# Patient Record
Sex: FEMALE | Race: OTHER | HISPANIC OR LATINO
[De-identification: names, ages, dates, MRNs, and addresses within clinical notes are randomized per-mention and may not be internally consistent; named-entity substitution may affect disease eponyms.]

---

## 2018-06-27 ENCOUNTER — TRANSCRIPTION ENCOUNTER (OUTPATIENT)
Age: 48
End: 2018-06-27

## 2019-11-25 ENCOUNTER — APPOINTMENT (OUTPATIENT)
Dept: SURGICAL ONCOLOGY | Facility: CLINIC | Age: 49
End: 2019-11-25
Payer: COMMERCIAL

## 2019-11-25 ENCOUNTER — OUTPATIENT (OUTPATIENT)
Dept: OUTPATIENT SERVICES | Facility: HOSPITAL | Age: 49
LOS: 1 days | End: 2019-11-25
Payer: COMMERCIAL

## 2019-11-25 VITALS
HEART RATE: 84 BPM | DIASTOLIC BLOOD PRESSURE: 72 MMHG | SYSTOLIC BLOOD PRESSURE: 126 MMHG | BODY MASS INDEX: 33.61 KG/M2 | HEIGHT: 61 IN | TEMPERATURE: 98.6 F | WEIGHT: 178 LBS

## 2019-11-25 DIAGNOSIS — Z78.9 OTHER SPECIFIED HEALTH STATUS: ICD-10-CM

## 2019-11-25 DIAGNOSIS — Z86.39 PERSONAL HISTORY OF OTHER ENDOCRINE, NUTRITIONAL AND METABOLIC DISEASE: ICD-10-CM

## 2019-11-25 DIAGNOSIS — Z83.438 FAMILY HISTORY OF OTHER DISORDER OF LIPOPROTEIN METABOLISM AND OTHER LIPIDEMIA: ICD-10-CM

## 2019-11-25 DIAGNOSIS — Z01.818 ENCOUNTER FOR OTHER PREPROCEDURAL EXAMINATION: ICD-10-CM

## 2019-11-25 PROCEDURE — 93010 ELECTROCARDIOGRAM REPORT: CPT

## 2019-11-25 PROCEDURE — 93005 ELECTROCARDIOGRAM TRACING: CPT

## 2019-11-25 PROCEDURE — 99203 OFFICE O/P NEW LOW 30 MIN: CPT

## 2019-11-25 RX ORDER — METFORMIN HYDROCHLORIDE 500 MG/1
500 TABLET, COATED ORAL
Refills: 0 | Status: ACTIVE | COMMUNITY

## 2019-11-25 NOTE — ASSESSMENT
[FreeTextEntry1] : I) GB polyp\par \par P) Long discussion w patient about findings.Has a concerning (based on MRI) GB polyp measuring 1cm. Treatment of choice is resection. Discussed laparoscopic cholecystectomy. If at the time of surgery we find a malignant process would do definitive wedge of liver and portal lymphadenectomy. Discussed that this is unlikely.  Will plan on minimally invasive approach. Risks and benefits discussed, including but not limited to post bleeding, infection. All questions answered. Will send Ca 19-9 and review MRI. Arrangements will be made. \par \par Deangelo Mclaughlin MD\par \par Chief Surgical Oncology\par Multidisciplinary GI cancer program\par [facility] [place]\par [facility]\par \par Professor Surgery\par [employer]\par \par \par cc Dr Camargo\par

## 2019-11-25 NOTE — PHYSICAL EXAM
[Normal] : supple, no neck mass and thyroid not enlarged [Normal] : oriented to person, place and time, with appropriate affect [de-identified] : Anicteric [de-identified] : S1,S2, regular rate and rhythm. No murmurs heard. [de-identified] : Clear throughout. No wheezes heard. [de-identified] : warm and dry

## 2019-11-25 NOTE — HISTORY OF PRESENT ILLNESS
[de-identified] : Patient Name: YULIYA FRANCOIS \par MRN: [id number] \par [place] MRN: \par Referring Provider: Dr. Camargo\par Oncologist: n/a\par Date: Nov 25, 2019 \par \par Diagnosis: Gallbladder polyp\par \par She  presents for evaluation of a gallbaldder polyp. She went for routine blood work with her PCP, Dr. Mancini and was noted to have elevated LFTs. She went for ultrasound of the abdomen showing a 1.2cm gallbladder polyp. She was referred to Dr. Camargo and had an MRI confirming a 1.0cm gallbladder polyp, surgical consultation is recommended due to its size and presence of enhancement. She is now here for surgical consultation.\par \par Curently, Ms. FRANCOIS occasionally has right sided abdominal pain and discomfort which started about a month ago. Pain radiates to her back but is unrelated to food, denies having decreased appetite, and denies nausea or vomiting. She denies changes in bowel habits and denies recent unintentional weight loss. She denies fevers, chills, or night sweats. No jaundice. Denies history of pancreatitis.\par \par Functional Status: Ms. FRANCOIS is able to walk up 2 flights of stairs without fatigue or dyspnea.\par \par

## 2019-11-25 NOTE — RESULTS/DATA
[FreeTextEntry1] : Diagnostic Studies\par \par Date: 9/14/19\par Study: Ultrasound abdomen ([facility])\par Results: (1) 1.2 cm gallbladder polyp. consider further characterization with MRI/MRCP (2) fatty infiltration of the liver\par Gallbladder - solid lobulated nodule measures 1.2x0.7x1.2cm\par \par Date: 10/22/19\par Study: MRI MRCP ([facility] radiology [facility])\par Results: (1) Hepatic steatosis (2) Findings most concordant with a 1.0cm gallbladder polyp. Given its size and presence of enhancement, malignancy cannot be excluded. There fore, surgical consultation is recommended. (3) No abdominal ascites or significant lymphadenopathy. (4) Additional findings described in detail above.\par \par \par Date:\par Study:\par Results:\par \par \par Date:\par Study:\par Results:\par \par Labs:\par \par Date:\par Results:\par \par Pathology:\par \par Date:\par Results:\par \par

## 2019-11-27 LAB
ABO + RH PNL BLD: NORMAL
ABO + RH PNL BLD: NORMAL
ALBUMIN SERPL ELPH-MCNC: 4.1 G/DL
ALP BLD-CCNC: 101 U/L
ALT SERPL-CCNC: 19 U/L
ANION GAP SERPL CALC-SCNC: 13 MMOL/L
APTT BLD: 31.3 SEC
AST SERPL-CCNC: 13 U/L
BASOPHILS # BLD AUTO: 0.06 K/UL
BASOPHILS NFR BLD AUTO: 0.8 %
BILIRUB DIRECT SERPL-MCNC: 0.1 MG/DL
BILIRUB INDIRECT SERPL-MCNC: 0.1 MG/DL
BILIRUB SERPL-MCNC: 0.2 MG/DL
BUN SERPL-MCNC: 20 MG/DL
CALCIUM SERPL-MCNC: 9.4 MG/DL
CANCER AG19-9 SERPL-ACNC: 8 U/ML
CHLORIDE SERPL-SCNC: 106 MMOL/L
CO2 SERPL-SCNC: 22 MMOL/L
CREAT SERPL-MCNC: 0.46 MG/DL
EOSINOPHIL # BLD AUTO: 0.11 K/UL
EOSINOPHIL NFR BLD AUTO: 1.5 %
GLUCOSE SERPL-MCNC: 111 MG/DL
HCT VFR BLD CALC: 42 %
HGB BLD-MCNC: 13.6 G/DL
IMM GRANULOCYTES NFR BLD AUTO: 0.4 %
INR PPP: 0.95 RATIO
LYMPHOCYTES # BLD AUTO: 2.79 K/UL
LYMPHOCYTES NFR BLD AUTO: 36.9 %
MAN DIFF?: NORMAL
MCHC RBC-ENTMCNC: 30.3 PG
MCHC RBC-ENTMCNC: 32.4 GM/DL
MCV RBC AUTO: 93.5 FL
MONOCYTES # BLD AUTO: 0.57 K/UL
MONOCYTES NFR BLD AUTO: 7.5 %
NEUTROPHILS # BLD AUTO: 4.01 K/UL
NEUTROPHILS NFR BLD AUTO: 52.9 %
PLATELET # BLD AUTO: 255 K/UL
POTASSIUM SERPL-SCNC: 4.2 MMOL/L
PROT SERPL-MCNC: 7.1 G/DL
PT BLD: 10.8 SEC
RBC # BLD: 4.49 M/UL
RBC # FLD: 13.4 %
SODIUM SERPL-SCNC: 141 MMOL/L
WBC # FLD AUTO: 7.57 K/UL

## 2019-12-20 ENCOUNTER — APPOINTMENT (OUTPATIENT)
Dept: SURGICAL ONCOLOGY | Facility: AMBULATORY SURGERY CENTER | Age: 49
End: 2019-12-20

## 2020-01-10 ENCOUNTER — RESULT REVIEW (OUTPATIENT)
Age: 50
End: 2020-01-10

## 2020-01-10 ENCOUNTER — OUTPATIENT (OUTPATIENT)
Dept: OUTPATIENT SERVICES | Facility: HOSPITAL | Age: 50
LOS: 1 days | Discharge: HOME | End: 2020-01-10
Payer: COMMERCIAL

## 2020-01-10 ENCOUNTER — APPOINTMENT (OUTPATIENT)
Dept: SURGICAL ONCOLOGY | Facility: AMBULATORY SURGERY CENTER | Age: 50
End: 2020-01-10

## 2020-01-10 LAB — GLUCOSE BLDC GLUCOMTR-MCNC: 123 MG/DL — HIGH (ref 70–99)

## 2020-01-10 PROCEDURE — 47562 LAPAROSCOPIC CHOLECYSTECTOMY: CPT

## 2020-01-10 PROCEDURE — 88304 TISSUE EXAM BY PATHOLOGIST: CPT | Mod: 26

## 2020-01-15 LAB — SURGICAL PATHOLOGY STUDY: SIGNIFICANT CHANGE UP

## 2020-01-28 ENCOUNTER — APPOINTMENT (OUTPATIENT)
Dept: SURGICAL ONCOLOGY | Facility: CLINIC | Age: 50
End: 2020-01-28
Payer: COMMERCIAL

## 2020-01-28 VITALS
BODY MASS INDEX: 32.85 KG/M2 | DIASTOLIC BLOOD PRESSURE: 68 MMHG | WEIGHT: 174 LBS | SYSTOLIC BLOOD PRESSURE: 119 MMHG | TEMPERATURE: 98.3 F | HEIGHT: 61 IN | HEART RATE: 78 BPM

## 2020-01-28 DIAGNOSIS — Z90.49 ACQUIRED ABSENCE OF OTHER SPECIFIED PARTS OF DIGESTIVE TRACT: ICD-10-CM

## 2020-01-28 DIAGNOSIS — K82.4 CHOLESTEROLOSIS OF GALLBLADDER: ICD-10-CM

## 2020-01-28 PROCEDURE — 99024 POSTOP FOLLOW-UP VISIT: CPT

## 2020-01-28 NOTE — HISTORY OF PRESENT ILLNESS
[FreeTextEntry1] : Patient Name: YULIYA FRANCOIS \par MRN: [id number] \par [facility] MRN: [id number] \par Referring Provider: Dr. Camargo\par Oncologist: n/a\par Date: 1/28/2020\par \par Diagnosis: gallbladder polyp\par Operative Date: 1/10/2020\par Procedure: lap cholecystectomy\par Pathology: polypoid chronic cholecystitis\par \par  18 days s/p lap cholecystectomy. She feels well overall. \par \par Currently, Ms. FRANCOIS occasionally has some mild right upper quadrant abdominal pain and discomfort. She denies fevers, chills, or night sweats. She is tolerating a low fat diet and is having regular bowel movements. Pain is well controlled.\par \par Final Pathology Showed: polypoid chronic cholecystitis\par \par

## 2020-01-28 NOTE — ASSESSMENT
[FreeTextEntry1] : I) normal postop\par \par P) RTC prn\par \par Deangelo Mclaughlin MD\par \par Chief Surgical Oncology\par Multidisciplinary GI cancer program\par [facility] [place]\par [facility]\par \par Professor Surgery\par [employer] [facility] [employer]\par \par cc Dr Camargo

## 2020-01-28 NOTE — PHYSICAL EXAM
[Normal] : well developed, well nourished, in no acute distress [de-identified] : soft, non tender, surgical sites are healing. no signs of infection

## 2022-06-30 ENCOUNTER — INPATIENT (INPATIENT)
Facility: HOSPITAL | Age: 52
LOS: 5 days | Discharge: HOME | DRG: 372 | End: 2022-07-06
Attending: INTERNAL MEDICINE | Admitting: INTERNAL MEDICINE
Payer: MEDICAID

## 2022-06-30 VITALS
HEART RATE: 97 BPM | TEMPERATURE: 97 F | OXYGEN SATURATION: 98 % | DIASTOLIC BLOOD PRESSURE: 88 MMHG | WEIGHT: 147.05 LBS | SYSTOLIC BLOOD PRESSURE: 117 MMHG | RESPIRATION RATE: 16 BRPM

## 2022-06-30 LAB
ALBUMIN SERPL ELPH-MCNC: 3.3 G/DL — LOW (ref 3.5–5)
ALP SERPL-CCNC: 120 U/L — SIGNIFICANT CHANGE UP (ref 40–120)
ALT FLD-CCNC: 323 U/L DA — HIGH (ref 10–60)
ANION GAP SERPL CALC-SCNC: 11 MMOL/L — SIGNIFICANT CHANGE UP (ref 5–17)
AST SERPL-CCNC: 170 U/L — HIGH (ref 10–40)
BASOPHILS # BLD AUTO: 0.04 K/UL — SIGNIFICANT CHANGE UP (ref 0–0.2)
BASOPHILS NFR BLD AUTO: 0.6 % — SIGNIFICANT CHANGE UP (ref 0–2)
BILIRUB SERPL-MCNC: 0.5 MG/DL — SIGNIFICANT CHANGE UP (ref 0.2–1.2)
BUN SERPL-MCNC: 12 MG/DL — SIGNIFICANT CHANGE UP (ref 7–18)
CALCIUM SERPL-MCNC: 8.8 MG/DL — SIGNIFICANT CHANGE UP (ref 8.4–10.5)
CHLORIDE SERPL-SCNC: 102 MMOL/L — SIGNIFICANT CHANGE UP (ref 96–108)
CO2 SERPL-SCNC: 20 MMOL/L — LOW (ref 22–31)
CREAT SERPL-MCNC: 0.57 MG/DL — SIGNIFICANT CHANGE UP (ref 0.5–1.3)
EGFR: 108 ML/MIN/1.73M2 — SIGNIFICANT CHANGE UP
EOSINOPHIL # BLD AUTO: 0.03 K/UL — SIGNIFICANT CHANGE UP (ref 0–0.5)
EOSINOPHIL NFR BLD AUTO: 0.4 % — SIGNIFICANT CHANGE UP (ref 0–6)
GLUCOSE SERPL-MCNC: 299 MG/DL — HIGH (ref 70–99)
HCT VFR BLD CALC: 46.2 % — HIGH (ref 34.5–45)
HGB BLD-MCNC: 15.9 G/DL — HIGH (ref 11.5–15.5)
IMM GRANULOCYTES NFR BLD AUTO: 0.4 % — SIGNIFICANT CHANGE UP (ref 0–1.5)
LIDOCAIN IGE QN: 92 U/L — SIGNIFICANT CHANGE UP (ref 73–393)
LYMPHOCYTES # BLD AUTO: 0.96 K/UL — LOW (ref 1–3.3)
LYMPHOCYTES # BLD AUTO: 14.2 % — SIGNIFICANT CHANGE UP (ref 13–44)
MCHC RBC-ENTMCNC: 30.6 PG — SIGNIFICANT CHANGE UP (ref 27–34)
MCHC RBC-ENTMCNC: 34.4 GM/DL — SIGNIFICANT CHANGE UP (ref 32–36)
MCV RBC AUTO: 89 FL — SIGNIFICANT CHANGE UP (ref 80–100)
MONOCYTES # BLD AUTO: 0.6 K/UL — SIGNIFICANT CHANGE UP (ref 0–0.9)
MONOCYTES NFR BLD AUTO: 8.9 % — SIGNIFICANT CHANGE UP (ref 2–14)
NEUTROPHILS # BLD AUTO: 5.11 K/UL — SIGNIFICANT CHANGE UP (ref 1.8–7.4)
NEUTROPHILS NFR BLD AUTO: 75.5 % — SIGNIFICANT CHANGE UP (ref 43–77)
NRBC # BLD: 0 /100 WBCS — SIGNIFICANT CHANGE UP (ref 0–0)
PLATELET # BLD AUTO: 191 K/UL — SIGNIFICANT CHANGE UP (ref 150–400)
POTASSIUM SERPL-MCNC: 3.7 MMOL/L — SIGNIFICANT CHANGE UP (ref 3.5–5.3)
POTASSIUM SERPL-SCNC: 3.7 MMOL/L — SIGNIFICANT CHANGE UP (ref 3.5–5.3)
PROT SERPL-MCNC: 7.5 G/DL — SIGNIFICANT CHANGE UP (ref 6–8.3)
RBC # BLD: 5.19 M/UL — SIGNIFICANT CHANGE UP (ref 3.8–5.2)
RBC # FLD: 12.8 % — SIGNIFICANT CHANGE UP (ref 10.3–14.5)
SODIUM SERPL-SCNC: 133 MMOL/L — LOW (ref 135–145)
WBC # BLD: 6.77 K/UL — SIGNIFICANT CHANGE UP (ref 3.8–10.5)
WBC # FLD AUTO: 6.77 K/UL — SIGNIFICANT CHANGE UP (ref 3.8–10.5)

## 2022-06-30 PROCEDURE — 99285 EMERGENCY DEPT VISIT HI MDM: CPT

## 2022-06-30 RX ORDER — SODIUM CHLORIDE 9 MG/ML
1000 INJECTION INTRAMUSCULAR; INTRAVENOUS; SUBCUTANEOUS ONCE
Refills: 0 | Status: COMPLETED | OUTPATIENT
Start: 2022-06-30 | End: 2022-06-30

## 2022-06-30 RX ADMIN — SODIUM CHLORIDE 1000 MILLILITER(S): 9 INJECTION INTRAMUSCULAR; INTRAVENOUS; SUBCUTANEOUS at 18:02

## 2022-06-30 RX ADMIN — SODIUM CHLORIDE 1000 MILLILITER(S): 9 INJECTION INTRAMUSCULAR; INTRAVENOUS; SUBCUTANEOUS at 19:44

## 2022-06-30 RX ADMIN — SODIUM CHLORIDE 1000 MILLILITER(S): 9 INJECTION INTRAMUSCULAR; INTRAVENOUS; SUBCUTANEOUS at 20:50

## 2022-06-30 RX ADMIN — SODIUM CHLORIDE 1000 MILLILITER(S): 9 INJECTION INTRAMUSCULAR; INTRAVENOUS; SUBCUTANEOUS at 19:15

## 2022-06-30 NOTE — ED PROVIDER NOTE - PROGRESS NOTE DETAILS
Patient is resting comfortably, NAD. Awaiting CTAP. Signed out to Dr. Bobo. Jihan: ct with mildly dilated CBD and possible retained stone. given elevated LFTs with ct result will admit for GI workup.

## 2022-06-30 NOTE — ED ADULT NURSE NOTE - ED STAT RN HANDOFF DETAILS
Report given to JP Rowley. Pt resting in bed, no acute distress noted, denies chest pain, no shortness of breath indicated.

## 2022-06-30 NOTE — ED ADULT TRIAGE NOTE - RESPIRATORY RATE (BREATHS/MIN)
How Severe Is It?: moderate Is This A New Presentation, Or A Follow-Up?: Follow Up Hidradenitis Suppurativa 16

## 2022-06-30 NOTE — ED ADULT NURSE NOTE - NSIMPLEMENTINTERV_GEN_ALL_ED
Implemented All Universal Safety Interventions:  [employer] to call system. Call bell, personal items and telephone within reach. Instruct patient to call for assistance. Room bathroom lighting operational. Non-slip footwear when patient is off stretcher. Physically safe environment: no spills, clutter or unnecessary equipment. Stretcher in lowest position, wheels locked, appropriate side rails in place.

## 2022-06-30 NOTE — ED ADULT NURSE NOTE - OBJECTIVE STATEMENT
Pt c/o diarrhea and elevated blood sugar x last night. No acute distress noted, denies chest pain, no shortness of breath indicated.

## 2022-07-01 DIAGNOSIS — R74.01 ELEVATION OF LEVELS OF LIVER TRANSAMINASE LEVELS: ICD-10-CM

## 2022-07-01 DIAGNOSIS — K80.50 CALCULUS OF BILE DUCT WITHOUT CHOLANGITIS OR CHOLECYSTITIS WITHOUT OBSTRUCTION: ICD-10-CM

## 2022-07-01 DIAGNOSIS — Z29.9 ENCOUNTER FOR PROPHYLACTIC MEASURES, UNSPECIFIED: ICD-10-CM

## 2022-07-01 DIAGNOSIS — R19.7 DIARRHEA, UNSPECIFIED: ICD-10-CM

## 2022-07-01 DIAGNOSIS — Z90.49 ACQUIRED ABSENCE OF OTHER SPECIFIED PARTS OF DIGESTIVE TRACT: Chronic | ICD-10-CM

## 2022-07-01 DIAGNOSIS — I10 ESSENTIAL (PRIMARY) HYPERTENSION: ICD-10-CM

## 2022-07-01 DIAGNOSIS — E11.9 TYPE 2 DIABETES MELLITUS WITHOUT COMPLICATIONS: ICD-10-CM

## 2022-07-01 DIAGNOSIS — E78.5 HYPERLIPIDEMIA, UNSPECIFIED: ICD-10-CM

## 2022-07-01 LAB
A1C WITH ESTIMATED AVERAGE GLUCOSE RESULT: 12.9 % — HIGH (ref 4–5.6)
ALBUMIN SERPL ELPH-MCNC: 2.7 G/DL — LOW (ref 3.5–5)
ALP SERPL-CCNC: 95 U/L — SIGNIFICANT CHANGE UP (ref 40–120)
ALT FLD-CCNC: 208 U/L DA — HIGH (ref 10–60)
ANION GAP SERPL CALC-SCNC: 9 MMOL/L — SIGNIFICANT CHANGE UP (ref 5–17)
APPEARANCE UR: CLEAR — SIGNIFICANT CHANGE UP
AST SERPL-CCNC: 53 U/L — HIGH (ref 10–40)
BILIRUB SERPL-MCNC: 0.3 MG/DL — SIGNIFICANT CHANGE UP (ref 0.2–1.2)
BILIRUB UR-MCNC: NEGATIVE — SIGNIFICANT CHANGE UP
BUN SERPL-MCNC: 10 MG/DL — SIGNIFICANT CHANGE UP (ref 7–18)
CALCIUM SERPL-MCNC: 8.1 MG/DL — LOW (ref 8.4–10.5)
CHLORIDE SERPL-SCNC: 107 MMOL/L — SIGNIFICANT CHANGE UP (ref 96–108)
CHOLEST SERPL-MCNC: 130 MG/DL — SIGNIFICANT CHANGE UP
CO2 SERPL-SCNC: 23 MMOL/L — SIGNIFICANT CHANGE UP (ref 22–31)
COLOR SPEC: YELLOW — SIGNIFICANT CHANGE UP
CREAT SERPL-MCNC: 0.36 MG/DL — LOW (ref 0.5–1.3)
DIFF PNL FLD: ABNORMAL
EGFR: 121 ML/MIN/1.73M2 — SIGNIFICANT CHANGE UP
ESTIMATED AVERAGE GLUCOSE: 324 MG/DL — HIGH (ref 68–114)
GLUCOSE BLDC GLUCOMTR-MCNC: 146 MG/DL — HIGH (ref 70–99)
GLUCOSE BLDC GLUCOMTR-MCNC: 179 MG/DL — HIGH (ref 70–99)
GLUCOSE BLDC GLUCOMTR-MCNC: 199 MG/DL — HIGH (ref 70–99)
GLUCOSE BLDC GLUCOMTR-MCNC: 217 MG/DL — HIGH (ref 70–99)
GLUCOSE BLDC GLUCOMTR-MCNC: 283 MG/DL — HIGH (ref 70–99)
GLUCOSE SERPL-MCNC: 174 MG/DL — HIGH (ref 70–99)
GLUCOSE UR QL: 1000 MG/DL
HCG UR QL: NEGATIVE — SIGNIFICANT CHANGE UP
HCT VFR BLD CALC: 40.6 % — SIGNIFICANT CHANGE UP (ref 34.5–45)
HDLC SERPL-MCNC: 33 MG/DL — LOW
HGB BLD-MCNC: 13.9 G/DL — SIGNIFICANT CHANGE UP (ref 11.5–15.5)
KETONES UR-MCNC: NEGATIVE — SIGNIFICANT CHANGE UP
LEUKOCYTE ESTERASE UR-ACNC: NEGATIVE — SIGNIFICANT CHANGE UP
LIPID PNL WITH DIRECT LDL SERPL: 68 MG/DL — SIGNIFICANT CHANGE UP
MAGNESIUM SERPL-MCNC: 2 MG/DL — SIGNIFICANT CHANGE UP (ref 1.6–2.6)
MCHC RBC-ENTMCNC: 30.3 PG — SIGNIFICANT CHANGE UP (ref 27–34)
MCHC RBC-ENTMCNC: 34.2 GM/DL — SIGNIFICANT CHANGE UP (ref 32–36)
MCV RBC AUTO: 88.6 FL — SIGNIFICANT CHANGE UP (ref 80–100)
NITRITE UR-MCNC: NEGATIVE — SIGNIFICANT CHANGE UP
NON HDL CHOLESTEROL: 97 MG/DL — SIGNIFICANT CHANGE UP
NRBC # BLD: 0 /100 WBCS — SIGNIFICANT CHANGE UP (ref 0–0)
PH UR: 5 — SIGNIFICANT CHANGE UP (ref 5–8)
PHOSPHATE SERPL-MCNC: 1.9 MG/DL — LOW (ref 2.5–4.5)
PLATELET # BLD AUTO: 180 K/UL — SIGNIFICANT CHANGE UP (ref 150–400)
POTASSIUM SERPL-MCNC: 2.9 MMOL/L — CRITICAL LOW (ref 3.5–5.3)
POTASSIUM SERPL-SCNC: 2.9 MMOL/L — CRITICAL LOW (ref 3.5–5.3)
PROT SERPL-MCNC: 6.1 G/DL — SIGNIFICANT CHANGE UP (ref 6–8.3)
PROT UR-MCNC: 15
RBC # BLD: 4.58 M/UL — SIGNIFICANT CHANGE UP (ref 3.8–5.2)
RBC # FLD: 13.2 % — SIGNIFICANT CHANGE UP (ref 10.3–14.5)
SARS-COV-2 RNA SPEC QL NAA+PROBE: SIGNIFICANT CHANGE UP
SODIUM SERPL-SCNC: 139 MMOL/L — SIGNIFICANT CHANGE UP (ref 135–145)
SP GR SPEC: 1.02 — SIGNIFICANT CHANGE UP (ref 1.01–1.02)
TRIGL SERPL-MCNC: 146 MG/DL — SIGNIFICANT CHANGE UP
TSH SERPL-MCNC: 1.33 UU/ML — SIGNIFICANT CHANGE UP (ref 0.34–4.82)
UROBILINOGEN FLD QL: NEGATIVE — SIGNIFICANT CHANGE UP
WBC # BLD: 4.73 K/UL — SIGNIFICANT CHANGE UP (ref 3.8–10.5)
WBC # FLD AUTO: 4.73 K/UL — SIGNIFICANT CHANGE UP (ref 3.8–10.5)

## 2022-07-01 PROCEDURE — 74177 CT ABD & PELVIS W/CONTRAST: CPT | Mod: 26,MG

## 2022-07-01 PROCEDURE — G1004: CPT

## 2022-07-01 RX ORDER — POTASSIUM CHLORIDE 20 MEQ
40 PACKET (EA) ORAL ONCE
Refills: 0 | Status: COMPLETED | OUTPATIENT
Start: 2022-07-01 | End: 2022-07-01

## 2022-07-01 RX ORDER — DEXTROSE 50 % IN WATER 50 %
25 SYRINGE (ML) INTRAVENOUS ONCE
Refills: 0 | Status: DISCONTINUED | OUTPATIENT
Start: 2022-07-01 | End: 2022-07-01

## 2022-07-01 RX ORDER — GLUCAGON INJECTION, SOLUTION 0.5 MG/.1ML
1 INJECTION, SOLUTION SUBCUTANEOUS ONCE
Refills: 0 | Status: DISCONTINUED | OUTPATIENT
Start: 2022-07-01 | End: 2022-07-06

## 2022-07-01 RX ORDER — INSULIN LISPRO 100/ML
VIAL (ML) SUBCUTANEOUS AT BEDTIME
Refills: 0 | Status: DISCONTINUED | OUTPATIENT
Start: 2022-07-01 | End: 2022-07-02

## 2022-07-01 RX ORDER — ENOXAPARIN SODIUM 100 MG/ML
40 INJECTION SUBCUTANEOUS EVERY 24 HOURS
Refills: 0 | Status: DISCONTINUED | OUTPATIENT
Start: 2022-07-01 | End: 2022-07-06

## 2022-07-01 RX ORDER — DEXTROSE 50 % IN WATER 50 %
12.5 SYRINGE (ML) INTRAVENOUS ONCE
Refills: 0 | Status: DISCONTINUED | OUTPATIENT
Start: 2022-07-01 | End: 2022-07-01

## 2022-07-01 RX ORDER — SODIUM CHLORIDE 9 MG/ML
1000 INJECTION, SOLUTION INTRAVENOUS
Refills: 0 | Status: DISCONTINUED | OUTPATIENT
Start: 2022-07-01 | End: 2022-07-06

## 2022-07-01 RX ORDER — INSULIN LISPRO 100/ML
VIAL (ML) SUBCUTANEOUS EVERY 8 HOURS
Refills: 0 | Status: DISCONTINUED | OUTPATIENT
Start: 2022-07-01 | End: 2022-07-02

## 2022-07-01 RX ORDER — POTASSIUM PHOSPHATE, MONOBASIC POTASSIUM PHOSPHATE, DIBASIC 236; 224 MG/ML; MG/ML
30 INJECTION, SOLUTION INTRAVENOUS ONCE
Refills: 0 | Status: COMPLETED | OUTPATIENT
Start: 2022-07-01 | End: 2022-07-01

## 2022-07-01 RX ORDER — PANTOPRAZOLE SODIUM 20 MG/1
40 TABLET, DELAYED RELEASE ORAL
Refills: 0 | Status: DISCONTINUED | OUTPATIENT
Start: 2022-07-01 | End: 2022-07-06

## 2022-07-01 RX ORDER — ONDANSETRON 8 MG/1
4 TABLET, FILM COATED ORAL ONCE
Refills: 0 | Status: DISCONTINUED | OUTPATIENT
Start: 2022-07-01 | End: 2022-07-06

## 2022-07-01 RX ORDER — SODIUM CHLORIDE 9 MG/ML
1000 INJECTION INTRAMUSCULAR; INTRAVENOUS; SUBCUTANEOUS
Refills: 0 | Status: DISCONTINUED | OUTPATIENT
Start: 2022-07-01 | End: 2022-07-06

## 2022-07-01 RX ORDER — INSULIN LISPRO 100/ML
VIAL (ML) SUBCUTANEOUS
Refills: 0 | Status: DISCONTINUED | OUTPATIENT
Start: 2022-07-01 | End: 2022-07-01

## 2022-07-01 RX ORDER — ATORVASTATIN CALCIUM 80 MG/1
20 TABLET, FILM COATED ORAL AT BEDTIME
Refills: 0 | Status: DISCONTINUED | OUTPATIENT
Start: 2022-07-01 | End: 2022-07-06

## 2022-07-01 RX ORDER — POTASSIUM CHLORIDE 20 MEQ
10 PACKET (EA) ORAL
Refills: 0 | Status: COMPLETED | OUTPATIENT
Start: 2022-07-01 | End: 2022-07-01

## 2022-07-01 RX ORDER — METFORMIN HYDROCHLORIDE 850 MG/1
1 TABLET ORAL
Qty: 0 | Refills: 0 | DISCHARGE

## 2022-07-01 RX ORDER — DEXTROSE 50 % IN WATER 50 %
15 SYRINGE (ML) INTRAVENOUS ONCE
Refills: 0 | Status: DISCONTINUED | OUTPATIENT
Start: 2022-07-01 | End: 2022-07-01

## 2022-07-01 RX ORDER — SODIUM CHLORIDE 9 MG/ML
1000 INJECTION, SOLUTION INTRAVENOUS
Refills: 0 | Status: DISCONTINUED | OUTPATIENT
Start: 2022-07-01 | End: 2022-07-01

## 2022-07-01 RX ADMIN — PANTOPRAZOLE SODIUM 40 MILLIGRAM(S): 20 TABLET, DELAYED RELEASE ORAL at 08:31

## 2022-07-01 RX ADMIN — ENOXAPARIN SODIUM 40 MILLIGRAM(S): 100 INJECTION SUBCUTANEOUS at 05:52

## 2022-07-01 RX ADMIN — Medication 40 MILLIEQUIVALENT(S): at 12:53

## 2022-07-01 RX ADMIN — POTASSIUM PHOSPHATE, MONOBASIC POTASSIUM PHOSPHATE, DIBASIC 83.33 MILLIMOLE(S): 236; 224 INJECTION, SOLUTION INTRAVENOUS at 23:29

## 2022-07-01 RX ADMIN — Medication 1: at 16:12

## 2022-07-01 RX ADMIN — Medication 3: at 06:00

## 2022-07-01 RX ADMIN — ATORVASTATIN CALCIUM 20 MILLIGRAM(S): 80 TABLET, FILM COATED ORAL at 21:36

## 2022-07-01 RX ADMIN — Medication 100 MILLIEQUIVALENT(S): at 12:54

## 2022-07-01 RX ADMIN — Medication 100 MILLIEQUIVALENT(S): at 16:03

## 2022-07-01 RX ADMIN — Medication 100 MILLIEQUIVALENT(S): at 21:36

## 2022-07-01 RX ADMIN — SODIUM CHLORIDE 75 MILLILITER(S): 9 INJECTION INTRAMUSCULAR; INTRAVENOUS; SUBCUTANEOUS at 06:08

## 2022-07-01 NOTE — H&P ADULT - HISTORY OF PRESENT ILLNESS
52 year old female with a past medical history of DM and HLD and surgical history of cholecystectomy coming to ED complaining of diarrhea since yesterday morning. Patient states that she has had 10 episodes of non bloody diarrhea episodes associated with 1 episode of non bloody non bilious vomiting. She also endorses generalized abdominal discomfort relieved by bowel movements. She denies any fevers, chills, headaches dizziness, chest pain, SOB, abd pain, hematuria, dysuria, numbness, and weakness.    In ED VS: 97.2, HR 97, /88, RR 16, Spo2 98%  Na 133, ,    CT ABD: Mildly dilated common bile duct with question of distal stone  Received 2L NS bolus in ED

## 2022-07-01 NOTE — H&P ADULT - ASSESSMENT
52 year old female with a past medical history of DM and HLD and surgical history of cholecystectomy coming to ED complaining of diarrhea since yesterday morning found to have dilated CBD with possible stone, admitted for further workup.

## 2022-07-01 NOTE — H&P ADULT - NEUROLOGICAL
normal/cranial nerves II-XII intact/sensation intact Azithromycin Counseling:  I discussed with the patient the risks of azithromycin including but not limited to GI upset, allergic reaction, drug rash, diarrhea, and yeast infections.

## 2022-07-01 NOTE — CHART NOTE - NSCHARTNOTEFT_GEN_A_CORE
EVENT: seen and examined   Pt c/o intermittent abd pain, no further diarrhea or vomiting     OBJECTIVE:  Vital Signs Last 24 Hrs  T(C): 36.9 (01 Jul 2022 11:20), Max: 37.7 (30 Jun 2022 20:20)  T(F): 98.4 (01 Jul 2022 11:20), Max: 99.8 (30 Jun 2022 20:20)  HR: 67 (01 Jul 2022 11:20) (67 - 97)  BP: 106/62 (01 Jul 2022 11:20) (100/65 - 117/88)  BP(mean): --  RR: 17 (01 Jul 2022 11:20) (16 - 18)  SpO2: 98% (01 Jul 2022 11:20) (95% - 99%)    REVIEW OF SYSTEMS:  CONSTITUTIONAL: No fever, chills  NECK: No pain or stiffness  RESPIRATORY: No cough, SOB  CARDIOVASCULAR: No chest pain, palpitations  GASTROINTESTINAL: +ve abdominal pain. No nausea, vomiting, or diarrhea  GENITOURINARY: No dysuria  NEUROLOGICAL: No HA  MUSCULOSKELETAL: No joint pain or swelling; No muscle, back, or extremity pain    PHYSICAL EXAM:  GENERAL: NAD  EYES: clear conjunctiva  NECK: Supple, No JVD  CHEST/LUNG: Clear to auscultation bilaterally; No rales, rhonchi, wheezing, or rubs  HEART: S1, S2, Regular rate and rhythm  ABDOMEN: Soft, Nontender, Nondistended; Bowel sounds present  NEURO: Alert & Oriented X3  EXTREMITIES: No LE edema, no calf tenderness    LABS:                        13.9   4.73  )-----------( 180      ( 01 Jul 2022 10:56 )             40.6     07-01    139  |  107  |  10  ----------------------------<  174<H>  2.9<LL>   |  23  |  0.36<L>    Ca    8.1<L>      01 Jul 2022 10:56  Phos  1.9     07-01  Mg     2.0     07-01    TPro  6.1  /  Alb  2.7<L>  /  TBili  0.3  /  DBili  x   /  AST  53<H>  /  ALT  208<H>  /  AlkPhos  95  07-01      < from: CT Abdomen and Pelvis w/ IV Cont (07.01.22 @ 00:59) >    PROCEDURE DATE:  07/01/2022          INTERPRETATION:  CLINICAL INFORMATION: Nausea and vomiting.    COMPARISON: None.    CONTRAST/COMPLICATIONS:  IV Contrast: Omnipaque 350  90 cc administered   10 cc discarded  Oral Contrast: NONE  Complications: None reported at time of study completion    PROCEDURE:  CT of the Abdomen and Pelvis was performed.  Sagittal and coronal reformats were performed.    FINDINGS:  LOWER CHEST: Within normal limits.    LIVER: Diffuse low attenuation suggesting steatosis.  BILE DUCTS: Dilatation of the common bile duct to 10 mm with questionable   stone in the distal duct measuring 7 mm (series 4 image 62). No   significant intrahepatic biliary duct dilatation  GALLBLADDER: Cholecystectomy.  SPLEEN: Within normal limits.  PANCREAS: Within normal limits.  ADRENALS: Within normal limits.  KIDNEYS/URETERS: Within normal limits.    BLADDER: Underdistended.  REPRODUCTIVE ORGANS: The uterus and adnexal structures are within normal   limits.    BOWEL: No bowel obstruction. Appendix is normal. No significant colonic   wall thickening or pericolonic inflammatory change.  PERITONEUM: No ascites.  VESSELS: Within normal limits.  RETROPERITONEUM/LYMPH NODES: No lymphadenopathy.  ABDOMINAL WALL: Within normal limits.  BONES: Within normal limits.    IMPRESSION:  No bowel obstruction, wall thickening or inflammatory change.    Mildly dilated common bile duct with question of distal stone. Consider   follow-up with MRCP for further evaluation.    < end of copied text >      A/P: 52 year old female with a past medical history of DM and HLD and surgical history of cholecystectomy coming to ED complaining of diarrhea     Diarrhea:   -likely due to gastroenteritis, diarrhea improved   -cont IVF   -start clear liquid  -supportive measures   -GI Dr. Chase consulted     Transaminitis.   -CT Abd with mildly dilated common bile duct with question of distal stone, hx of cholecystectomy 2 years ago   -mon hepatic function   -rest plan as above     Dvt ppx;   -Lovenox EVENT: seen and examined   Pt c/o intermittent abd pain, no further diarrhea or vomiting     OBJECTIVE:  Vital Signs Last 24 Hrs  T(C): 36.9 (01 Jul 2022 11:20), Max: 37.7 (30 Jun 2022 20:20)  T(F): 98.4 (01 Jul 2022 11:20), Max: 99.8 (30 Jun 2022 20:20)  HR: 67 (01 Jul 2022 11:20) (67 - 97)  BP: 106/62 (01 Jul 2022 11:20) (100/65 - 117/88)  BP(mean): --  RR: 17 (01 Jul 2022 11:20) (16 - 18)  SpO2: 98% (01 Jul 2022 11:20) (95% - 99%)    REVIEW OF SYSTEMS:  CONSTITUTIONAL: No fever, chills  NECK: No pain or stiffness  RESPIRATORY: No cough, SOB  CARDIOVASCULAR: No chest pain, palpitations  GASTROINTESTINAL: +ve abdominal pain. No nausea, vomiting, or diarrhea  GENITOURINARY: No dysuria  NEUROLOGICAL: No HA  MUSCULOSKELETAL: No joint pain or swelling; No muscle, back, or extremity pain    PHYSICAL EXAM:  GENERAL: NAD  EYES: clear conjunctiva  NECK: Supple, No JVD  CHEST/LUNG: Clear to auscultation bilaterally; No rales, rhonchi, wheezing, or rubs  HEART: S1, S2, Regular rate and rhythm  ABDOMEN: Soft, Nontender, Nondistended; Bowel sounds present  NEURO: Alert & Oriented X3  EXTREMITIES: No LE edema, no calf tenderness    LABS:                        13.9   4.73  )-----------( 180      ( 01 Jul 2022 10:56 )             40.6     07-01    139  |  107  |  10  ----------------------------<  174<H>  2.9<LL>   |  23  |  0.36<L>    Ca    8.1<L>      01 Jul 2022 10:56  Phos  1.9     07-01  Mg     2.0     07-01    TPro  6.1  /  Alb  2.7<L>  /  TBili  0.3  /  DBili  x   /  AST  53<H>  /  ALT  208<H>  /  AlkPhos  95  07-01      < from: CT Abdomen and Pelvis w/ IV Cont (07.01.22 @ 00:59) >    PROCEDURE DATE:  07/01/2022          INTERPRETATION:  CLINICAL INFORMATION: Nausea and vomiting.    COMPARISON: None.    CONTRAST/COMPLICATIONS:  IV Contrast: Omnipaque 350  90 cc administered   10 cc discarded  Oral Contrast: NONE  Complications: None reported at time of study completion    PROCEDURE:  CT of the Abdomen and Pelvis was performed.  Sagittal and coronal reformats were performed.    FINDINGS:  LOWER CHEST: Within normal limits.    LIVER: Diffuse low attenuation suggesting steatosis.  BILE DUCTS: Dilatation of the common bile duct to 10 mm with questionable   stone in the distal duct measuring 7 mm (series 4 image 62). No   significant intrahepatic biliary duct dilatation  GALLBLADDER: Cholecystectomy.  SPLEEN: Within normal limits.  PANCREAS: Within normal limits.  ADRENALS: Within normal limits.  KIDNEYS/URETERS: Within normal limits.    BLADDER: Underdistended.  REPRODUCTIVE ORGANS: The uterus and adnexal structures are within normal   limits.    BOWEL: No bowel obstruction. Appendix is normal. No significant colonic   wall thickening or pericolonic inflammatory change.  PERITONEUM: No ascites.  VESSELS: Within normal limits.  RETROPERITONEUM/LYMPH NODES: No lymphadenopathy.  ABDOMINAL WALL: Within normal limits.  BONES: Within normal limits.    IMPRESSION:  No bowel obstruction, wall thickening or inflammatory change.    Mildly dilated common bile duct with question of distal stone. Consider   follow-up with MRCP for further evaluation.    < end of copied text >      A/P: 52 year old female with a past medical history of DM and HLD and surgical history of cholecystectomy coming to ED complaining of diarrhea     Diarrhea:   -likely due to gastroenteritis, diarrhea improved   -cont IVF   -start clear liquid  -supportive measures   -GI Dr. Chase consulted     Transaminitis.   -CT Abd with mildly dilated common bile duct with question of distal stone, hx of cholecystectomy 2 years ago   -mon hepatic function   -rest plan as above     Hypokalemia:  -likely due to poor PO intake and GI loses   -supplemented   -f/u repeat in AM     Hypophosphatemia:   -likely due to poor PO intake and GI loses   -supplemented   -f/u repeat in AM     Dvt ppx;   -Lovenox

## 2022-07-01 NOTE — CONSULT NOTE ADULT - NEGATIVE ENMT SYMPTOMS
no hearing difficulty/no ear pain/no tinnitus/no vertigo/no sinus symptoms/no nasal discharge/no nasal obstruction/no nose bleeds/no dry mouth/no throat pain/no dysphagia

## 2022-07-01 NOTE — CONSULT NOTE ADULT - MUSCULOSKELETAL
ROM intact/no joint swelling/no joint erythema/no joint warmth/no calf tenderness/decreased ROM/no chest wall tenderness details…

## 2022-07-01 NOTE — CONSULT NOTE ADULT - NEGATIVE MUSCULOSKELETAL SYMPTOMS
no muscle cramps/no muscle weakness/no neck pain/no arm pain L/no arm pain R/no back pain/no leg pain L/no leg pain R

## 2022-07-01 NOTE — PATIENT PROFILE ADULT - FALL HARM RISK - UNIVERSAL INTERVENTIONS
Bed in lowest position, wheels locked, appropriate side rails in place/Call bell, personal items and telephone in reach/Instruct patient to call for assistance before getting out of bed or chair/Non-slip footwear when patient is out of bed/[place] to call system/Physically safe environment - no spills, clutter or unnecessary equipment/Purposeful Proactive Rounding/Room/bathroom lighting operational, light cord in reach

## 2022-07-01 NOTE — CONSULT NOTE ADULT - SUBJECTIVE AND OBJECTIVE BOX
[  ] STAT REQUEST              [ X ]ROUTINE REQUEST      Patient is a 52 year old female with diarrhea and dilated CBD. GI consulted to evaluate.       HPI:  52 year old female with a past medical history significant for DM and HLD and surgical history of cholecystectomy presented to the emergency room with 2 days history of watery, non bloody diarrhea associated with intermittent crampy non radiating, 5-6/10 intensity periumbilical abdominal painone episode of non bloody vomiting. Patient denies hematemesis, hematochezia, melena, fever, chills, chest pain, SOB, cough, hematuria, dysuria or recent traveling or antibiotic use.        PAIN MANAGEMENT:  Pain Scale:                 5-6/10  Pain Location:  Periumbilical abdominal pain      Prior Colonoscopy:      PAST MEDICAL HISTORY  Diabetes mellitus  Hypertension      PAST SURGICAL HISTORY  Cholecystectomy        Allergies    No Known Allergies    Intolerances  None         MEDICATIONS  (STANDING):  atorvastatin 20 milliGRAM(s) Oral at bedtime  dextrose 5%. 1000 milliLiter(s) (50 mL/Hr) IV Continuous <Continuous>  enoxaparin Injectable 40 milliGRAM(s) SubCutaneous every 24 hours  glucagon  Injectable 1 milliGRAM(s) IntraMuscular once  insulin lispro (ADMELOG) corrective regimen sliding scale   SubCutaneous at bedtime  insulin lispro (ADMELOG) corrective regimen sliding scale.   SubCutaneous every 8 hours  pantoprazole    Tablet 40 milliGRAM(s) Oral before breakfast  potassium chloride  10 mEq/100 mL IVPB 10 milliEquivalent(s) IV Intermittent every 1 hour  potassium phosphate IVPB 30 milliMole(s) IV Intermittent once  sodium chloride 0.9%. 1000 milliLiter(s) (75 mL/Hr) IV Continuous <Continuous>    MEDICATIONS  (PRN):  ondansetron Injectable 4 milliGRAM(s) IV Push once PRN Nausea and/or Vomiting      SOCIAL HISTORY  Advanced Directives:       [ X ] Full Code       [  ] DNR  Marital Status:         [  ] M      [ X ] S      [  ] D       [  ] W  Children:       [ X ] Yes      [  ] No  Occupation:        [  ] Employed       [X  ] Unemployed       [  ] Retired  Diet:       [ X ] Regular       [  ] PEG feeding          [  ] NG tube feeding  Drug Use:           [ X ] Patient denied          [  ] Yes  Alcohol:           [ X ] No             [  ] Yes (socially)         [  ] Yes (chronic)  Tobacco:           [  ] Yes           [ X ] No      FAMILY HISTORY  [ X ] Heart Disease            [ X ] Diabetes             [ X ] HTN             [  ] Colon Cancer             [  ] Stomach Cancer              [  ] Pancreatic Cancer      VITAL SIGNS   Vital Signs Last 24 Hrs  T(C): 36.9 (01 Jul 2022 15:33), Max: 37.7 (30 Jun 2022 20:20)  T(F): 98.5 (01 Jul 2022 15:33), Max: 99.8 (30 Jun 2022 20:20)  HR: 68 (01 Jul 2022 15:33) (67 - 94)  BP: 92/55 (01 Jul 2022 15:33) (92/55 - 110/69)   RR: 17 (01 Jul 2022 15:33) (16 - 18)  SpO2: 98% (01 Jul 2022 15:33) (95% - 99%)          CBC Full  -  ( 01 Jul 2022 10:56 )  WBC Count : 4.73 K/uL  RBC Count : 4.58 M/uL  Hemoglobin : 13.9 g/dL  Hematocrit : 40.6 %  Platelet Count - Automated : 180 K/uL  Mean Cell Volume : 88.6 fl  Mean Cell Hemoglobin : 30.3 pg  Mean Cell Hemoglobin Concentration : 34.2 gm/dL  Auto Neutrophil # : x  Auto Lymphocyte # : x  Auto Monocyte # : x  Auto Eosinophil # : x  Auto Basophil # : x  Auto Neutrophil % : x  Auto Lymphocyte % : x  Auto Monocyte % : x  Auto Eosinophil % : x  Auto Basophil % : x      07-01    139  |  107  |  10  ----------------------------<  174<H>  2.9<LL>   |  23  |  0.36<L>    Ca    8.1<L>      01 Jul 2022 10:56  Phos  1.9     07-01  Mg     2.0     07-01    TPro  6.1  /  Alb  2.7<L>  /  TBili  0.3  /  DBili  x   /  AST  53<H>  /  ALT  208<H>  /  AlkPhos  95  07-01    Lipase, Serum: 92 U/L (06-30 @ 17:18)      Urinalysis (07.01.22 @ 01:07)   Glucose Qualitative, Urine: 1000 mg/dL   Blood, Urine: Moderate   pH Urine: 5.0   Color: Yellow   Urine Appearance: Clear   Bilirubin: Negative   Ketone - Urine: Negative   Specific Gravity: 1.020   Protein, Urine: 15   Urobilinogen: Negative   Nitrite: Negative   Leukocyte Esterase Concentration: Negative < from: 12 Lead ECG (11.25.19 @ 13:02) >    Ventricular Rate 69 BPM    Atrial Rate 69 BPM    P-R Interval 134 ms    QRS Duration 78 ms    Q-T Interval 386 ms    QTC Calculation(Bezet) 413 ms    P Axis 39 degrees    R Axis 57 degrees    T Axis 48 degrees    Diagnosis Line Normal sinus rhythm  EKG within normal limits    < end of copied text >      RADIOLOGY/IMAGING                ACC: [id number] EXAM:  CT ABDOMEN AND PELVIS IC                          PROCEDURE DATE:  07/01/2022          INTERPRETATION:  CLINICAL INFORMATION: Nausea and vomiting.    COMPARISON: None.    CONTRAST/COMPLICATIONS:  IV Contrast: Omnipaque 350  90 cc administered   10 cc discarded  Oral Contrast: NONE  Complications: None reported at time of study completion    PROCEDURE:  CT of the Abdomen and Pelvis was performed.  Sagittal and coronal reformats were performed.    FINDINGS:  LOWER CHEST: Within normal limits.    LIVER: Diffuse low attenuation suggesting steatosis.  BILE DUCTS: Dilatation of the common bile duct to 10 mm with questionable   stone in the distal duct measuring 7 mm (series 4 image 62). No   significant intrahepatic biliary duct dilatation  GALLBLADDER: Cholecystectomy.  SPLEEN: Within normal limits.  PANCREAS: Within normal limits.  ADRENALS: Within normal limits.  KIDNEYS/URETERS: Within normal limits.    BLADDER: Underdistended.  REPRODUCTIVE ORGANS: The uterus and adnexal structures are within normal   limits.    BOWEL: No bowel obstruction. Appendix is normal. No significant colonic   wall thickening or pericolonic inflammatory change.  PERITONEUM: No ascites.  VESSELS: Within normal limits.  RETROPERITONEUM/LYMPH NODES: No lymphadenopathy.  ABDOMINAL WALL: Within normal limits.  BONES: Within normal limits.    IMPRESSION:  No bowel obstruction, wall thickening or inflammatory change.    Mildly dilated common bile duct with question of distal stone. Consider   follow-up with MRCP for further evaluation.               [  ] STAT REQUEST              [ X ]ROUTINE REQUEST      Patient is a 52 year old female with diarrhea and dilated CBD. GI consulted to evaluate.       HPI:  52 year old female with a past medical history significant for DM and HLD and surgical history of cholecystectomy presented to the emergency room with 2 days history of watery, non bloody diarrhea associated with intermittent crampy non radiating, 5-6/10 intensity periumbilical abdominal painone episode of non bloody vomiting. Patient denies hematemesis, hematochezia, melena, fever, chills, chest pain, SOB, cough, hematuria, dysuria or recent traveling or antibiotic use.        PAIN MANAGEMENT:  Pain Scale:                 5-6/10  Pain Location:  Periumbilical abdominal pain      Prior Colonoscopy:      PAST MEDICAL HISTORY  Diabetes mellitus  Hypertension      PAST SURGICAL HISTORY  Cholecystectomy        Allergies    No Known Allergies    Intolerances  None         MEDICATIONS  (STANDING):  atorvastatin 20 milliGRAM(s) Oral at bedtime  dextrose 5%. 1000 milliLiter(s) (50 mL/Hr) IV Continuous <Continuous>  enoxaparin Injectable 40 milliGRAM(s) SubCutaneous every 24 hours  glucagon  Injectable 1 milliGRAM(s) IntraMuscular once  insulin lispro (ADMELOG) corrective regimen sliding scale   SubCutaneous at bedtime  insulin lispro (ADMELOG) corrective regimen sliding scale.   SubCutaneous every 8 hours  pantoprazole    Tablet 40 milliGRAM(s) Oral before breakfast  potassium chloride  10 mEq/100 mL IVPB 10 milliEquivalent(s) IV Intermittent every 1 hour  potassium phosphate IVPB 30 milliMole(s) IV Intermittent once  sodium chloride 0.9%. 1000 milliLiter(s) (75 mL/Hr) IV Continuous <Continuous>    MEDICATIONS  (PRN):  ondansetron Injectable 4 milliGRAM(s) IV Push once PRN Nausea and/or Vomiting      SOCIAL HISTORY  Advanced Directives:       [ X ] Full Code       [  ] DNR  Marital Status:         [  ] M      [ X ] S      [  ] D       [  ] W  Children:       [ X ] Yes      [  ] No  Occupation:        [  ] Employed       [X  ] Unemployed       [  ] Retired  Diet:       [ X ] Regular       [  ] PEG feeding          [  ] NG tube feeding  Drug Use:           [ X ] Patient denied          [  ] Yes  Alcohol:           [ X ] No             [  ] Yes (socially)         [  ] Yes (chronic)  Tobacco:           [  ] Yes           [ X ] No      FAMILY HISTORY  [ X ] Heart Disease            [ X ] Diabetes             [ X ] HTN             [  ] Colon Cancer             [  ] Stomach Cancer              [  ] Pancreatic Cancer      VITAL SIGNS   Vital Signs Last 24 Hrs  T(C): 36.9 (01 Jul 2022 15:33), Max: 37.7 (30 Jun 2022 20:20)  T(F): 98.5 (01 Jul 2022 15:33), Max: 99.8 (30 Jun 2022 20:20)  HR: 68 (01 Jul 2022 15:33) (67 - 94)  BP: 92/55 (01 Jul 2022 15:33) (92/55 - 110/69)   RR: 17 (01 Jul 2022 15:33) (16 - 18)  SpO2: 98% (01 Jul 2022 15:33) (95% - 99%)          CBC Full  -  ( 01 Jul 2022 10:56 )  WBC Count : 4.73 K/uL  RBC Count : 4.58 M/uL  Hemoglobin : 13.9 g/dL  Hematocrit : 40.6 %  Platelet Count - Automated : 180 K/uL  Mean Cell Volume : 88.6 fl  Mean Cell Hemoglobin : 30.3 pg  Mean Cell Hemoglobin Concentration : 34.2 gm/dL  Auto Neutrophil # : x  Auto Lymphocyte # : x  Auto Monocyte # : x  Auto Eosinophil # : x  Auto Basophil # : x  Auto Neutrophil % : x  Auto Lymphocyte % : x  Auto Monocyte % : x  Auto Eosinophil % : x  Auto Basophil % : x      07-01    139  |  107  |  10  ----------------------------<  174<H>  2.9<LL>   |  23  |  0.36<L>    Ca    8.1<L>      01 Jul 2022 10:56  Phos  1.9     07-01  Mg     2.0     07-01    TPro  6.1  /  Alb  2.7<L>  /  TBili  0.3  /  DBili  x   /  AST  53<H>  /  ALT  208<H>  /  AlkPhos  95  07-01    Lipase, Serum: 92 U/L (06-30 @ 17:18)      Urinalysis (07.01.22 @ 01:07)   Glucose Qualitative, Urine: 1000 mg/dL   Blood, Urine: Moderate   pH Urine: 5.0   Color: Yellow   Urine Appearance: Clear   Bilirubin: Negative   Ketone - Urine: Negative   Specific Gravity: 1.020   Protein, Urine: 15   Urobilinogen: Negative   Nitrite: Negative   Leukocyte Esterase Concentration: Negative       ECG    Ventricular Rate 69 BPM    Atrial Rate 69 BPM    P-R Interval 134 ms    QRS Duration 78 ms    Q-T Interval 386 ms    QTC Calculation(Bezet) 413 ms    P Axis 39 degrees    R Axis 57 degrees    T Axis 48 degrees    Diagnosis Line Normal sinus rhythm  EKG within normal limits         RADIOLOGY/IMAGING                ACC: [id number] EXAM:  CT ABDOMEN AND PELVIS IC                          PROCEDURE DATE:  07/01/2022          INTERPRETATION:  CLINICAL INFORMATION: Nausea and vomiting.    COMPARISON: None.    CONTRAST/COMPLICATIONS:  IV Contrast: Omnipaque 350  90 cc administered   10 cc discarded  Oral Contrast: NONE  Complications: None reported at time of study completion    PROCEDURE:  CT of the Abdomen and Pelvis was performed.  Sagittal and coronal reformats were performed.    FINDINGS:  LOWER CHEST: Within normal limits.    LIVER: Diffuse low attenuation suggesting steatosis.  BILE DUCTS: Dilatation of the common bile duct to 10 mm with questionable   stone in the distal duct measuring 7 mm (series 4 image 62). No   significant intrahepatic biliary duct dilatation  GALLBLADDER: Cholecystectomy.  SPLEEN: Within normal limits.  PANCREAS: Within normal limits.  ADRENALS: Within normal limits.  KIDNEYS/URETERS: Within normal limits.    BLADDER: Underdistended.  REPRODUCTIVE ORGANS: The uterus and adnexal structures are within normal   limits.    BOWEL: No bowel obstruction. Appendix is normal. No significant colonic   wall thickening or pericolonic inflammatory change.  PERITONEUM: No ascites.  VESSELS: Within normal limits.  RETROPERITONEUM/LYMPH NODES: No lymphadenopathy.  ABDOMINAL WALL: Within normal limits.  BONES: Within normal limits.    IMPRESSION:  No bowel obstruction, wall thickening or inflammatory change.    Mildly dilated common bile duct with question of distal stone. Consider   follow-up with MRCP for further evaluation.

## 2022-07-01 NOTE — CONSULT NOTE ADULT - SUBJECTIVE AND OBJECTIVE BOX
Patient is a 52y old  Female who presents with a chief complaint of diarrhea, decreased oral intake (01 Jul 2022 05:19)  52 year old female with a past medical history of DM and HLD and surgical history of cholecystectomy coming to ED complaining of diarrhea since yesterday morning. Patient states that she has had 10 episodes of non bloody diarrhea episodes associated with 1 episode of non bloody non bilious vomiting. She also endorses generalized abdominal discomfort relieved by bowel movements. She denies any fevers, chills, headaches dizziness, chest pain, SOB, abd pain, hematuria, dysuria, numbness, and weakness.    INTERVAL HPI/OVERNIGHT EVENTS:  T(C): 36.6 (07-01-22 @ 00:00), Max: 37.7 (06-30-22 @ 20:20)  HR: 67 (07-01-22 @ 05:29) (67 - 97)  BP: 107/65 (07-01-22 @ 05:29) (100/68 - 117/88)  RR: 18 (07-01-22 @ 05:29) (16 - 18)  SpO2: 95% (07-01-22 @ 05:29) (95% - 98%)  Wt(kg): --  I&O's Summary      PAST MEDICAL & SURGICAL HISTORY:  Diabetes mellitus      Hypertension      S/P cholecystectomy          SOCIAL HISTORY  Alcohol:  Tobacco:  Illicit substance use:      FAMILY HISTORY:      LABS:                        15.9   6.77  )-----------( 191      ( 30 Jun 2022 17:18 )             46.2     06-30    133<L>  |  102  |  12  ----------------------------<  299<H>  3.7   |  20<L>  |  0.57    Ca    8.8      30 Jun 2022 17:18    TPro  7.5  /  Alb  3.3<L>  /  TBili  0.5  /  DBili  x   /  AST  170<H>  /  ALT  323<H>  /  AlkPhos  120  06-30      Urinalysis Basic - ( 01 Jul 2022 01:08 )    Color: x / Appearance: x / SG: x / pH: x  Gluc: x / Ketone: x  / Bili: x / Urobili: x   Blood: x / Protein: x / Nitrite: x   Leuk Esterase: x / RBC: 0-2 /HPF / WBC 3-5 /HPF   Sq Epi: x / Non Sq Epi: Few /HPF / Bacteria: Many /HPF      CAPILLARY BLOOD GLUCOSE      POCT Blood Glucose.: 315 mg/dL (30 Jun 2022 16:48)        Urinalysis Basic - ( 01 Jul 2022 01:08 )    Color: x / Appearance: x / SG: x / pH: x  Gluc: x / Ketone: x  / Bili: x / Urobili: x   Blood: x / Protein: x / Nitrite: x   Leuk Esterase: x / RBC: 0-2 /HPF / WBC 3-5 /HPF   Sq Epi: x / Non Sq Epi: Few /HPF / Bacteria: Many /HPF        MEDICATIONS  (STANDING):  atorvastatin 20 milliGRAM(s) Oral at bedtime  dextrose 5%. 1000 milliLiter(s) (50 mL/Hr) IV Continuous <Continuous>  enoxaparin Injectable 40 milliGRAM(s) SubCutaneous every 24 hours  glucagon  Injectable 1 milliGRAM(s) IntraMuscular once  insulin lispro (ADMELOG) corrective regimen sliding scale   SubCutaneous at bedtime  insulin lispro (ADMELOG) corrective regimen sliding scale.   SubCutaneous every 8 hours  pantoprazole    Tablet 40 milliGRAM(s) Oral before breakfast  sodium chloride 0.9%. 1000 milliLiter(s) (75 mL/Hr) IV Continuous <Continuous>    MEDICATIONS  (PRN):  ondansetron Injectable 4 milliGRAM(s) IV Push once PRN Nausea and/or Vomiting      REVIEW OF SYSTEMS:  CONSTITUTIONAL: No fever, weight loss, or fatigue  EYES: No eye pain, visual disturbances, or discharge  ENMT:  No difficulty hearing, tinnitus, vertigo; No sinus or throat pain  NECK: No pain or stiffness  RESPIRATORY: No cough, wheezing, chills or hemoptysis; No shortness of breath  CARDIOVASCULAR: No chest pain, palpitations, dizziness, or leg swelling  GASTROINTESTINAL: No abdominal or epigastric pain. No nausea, vomiting, or hematemesis; No diarrhea or constipation. No melena or hematochezia.  GENITOURINARY: No dysuria, frequency, hematuria, or incontinence  NEUROLOGICAL: No headaches, memory loss, loss of strength, numbness, or tremors  SKIN: No itching, burning, rashes, or lesions   LYMPH NODES: No enlarged glands  ENDOCRINE: No heat or cold intolerance; No hair loss  MUSCULOSKELETAL: No joint pain or swelling; No muscle, back, or extremity pain  PSYCHIATRIC: No depression, anxiety, mood swings, or difficulty sleeping  HEME/LYMPH: No easy bruising, or bleeding gums  ALLERY AND IMMUNOLOGIC: No hives or eczema    PHYSICAL EXAM:  GENERAL: NAD, well-groomed, well-developed  HEAD:  Atraumatic, Normocephalic  EYES: EOMI, PERRLA, conjunctiva and sclera clear  ENMT: No tonsillar erythema, exudates, or enlargement; Moist mucous membranes, Good dentition, No lesions  NECK: Supple, No JVD, Normal thyroid  NERVOUS SYSTEM:  Alert & Oriented X3, Good concentration; Motor Strength 5/5 B/L upper and lower extremities; DTRs 2+ intact and symmetric  CHEST/LUNG: Clear to percussion bilaterally; No rales, rhonchi, wheezing, or rubs  HEART: Regular rate and rhythm; No murmurs, rubs, or gallops  ABDOMEN: Soft, Nontender, Nondistended; Bowel sounds present  EXTREMITIES:  2+ Peripheral Pulses, No clubbing, cyanosis, or edema  LYMPH: No lymphadenopathy noted  SKIN: No rashes or lesions    RADIOLOGY & ADDITIONAL TESTS:    Imaging Personally Reviewed:  [ ] YES  [ ] NO    Consultant(s) Notes Reviewed:  [ ] YES  [ ] NO        Care Discussed with Consultants/Other Providers [ ] YES  [ ] NO Patient is a 52y old  Female who presents with a chief complaint of diarrhea, decreased oral intake (01 Jul 2022 05:19)  52 year old female with a past medical history of DM and HLD and surgical history of cholecystectomy coming to ED complaining of diarrhea since yesterday morning. Patient states that she has had 10 episodes of non bloody diarrhea episodes associated with 1 episode of non bloody non bilious vomiting. She also endorses generalized abdominal discomfort relieved by bowel movements. She denies any fevers, chills, headaches dizziness, chest pain, SOB, abd pain, hematuria, dysuria, numbness, and weakness.    INTERVAL HPI/OVERNIGHT EVENTS:  T(C): 36.6 (07-01-22 @ 00:00), Max: 37.7 (06-30-22 @ 20:20)  HR: 67 (07-01-22 @ 05:29) (67 - 97)  BP: 107/65 (07-01-22 @ 05:29) (100/68 - 117/88)  RR: 18 (07-01-22 @ 05:29) (16 - 18)  SpO2: 95% (07-01-22 @ 05:29) (95% - 98%)  Wt(kg): --  I&O's Summary      PAST MEDICAL & SURGICAL HISTORY:  Diabetes mellitus      Hypertension      S/P cholecystectomy          SOCIAL HISTORY  Alcohol:  Tobacco:  Illicit substance use:      FAMILY HISTORY:      LABS:                        15.9   6.77  )-----------( 191      ( 30 Jun 2022 17:18 )             46.2     06-30    133<L>  |  102  |  12  ----------------------------<  299<H>  3.7   |  20<L>  |  0.57    Ca    8.8      30 Jun 2022 17:18    TPro  7.5  /  Alb  3.3<L>  /  TBili  0.5  /  DBili  x   /  AST  170<H>  /  ALT  323<H>  /  AlkPhos  120  06-30      Urinalysis Basic - ( 01 Jul 2022 01:08 )    Color: x / Appearance: x / SG: x / pH: x  Gluc: x / Ketone: x  / Bili: x / Urobili: x   Blood: x / Protein: x / Nitrite: x   Leuk Esterase: x / RBC: 0-2 /HPF / WBC 3-5 /HPF   Sq Epi: x / Non Sq Epi: Few /HPF / Bacteria: Many /HPF      CAPILLARY BLOOD GLUCOSE      POCT Blood Glucose.: 315 mg/dL (30 Jun 2022 16:48)        Urinalysis Basic - ( 01 Jul 2022 01:08 )    Color: x / Appearance: x / SG: x / pH: x  Gluc: x / Ketone: x  / Bili: x / Urobili: x   Blood: x / Protein: x / Nitrite: x   Leuk Esterase: x / RBC: 0-2 /HPF / WBC 3-5 /HPF   Sq Epi: x / Non Sq Epi: Few /HPF / Bacteria: Many /HPF        MEDICATIONS  (STANDING):  atorvastatin 20 milliGRAM(s) Oral at bedtime  dextrose 5%. 1000 milliLiter(s) (50 mL/Hr) IV Continuous <Continuous>  enoxaparin Injectable 40 milliGRAM(s) SubCutaneous every 24 hours  glucagon  Injectable 1 milliGRAM(s) IntraMuscular once  insulin lispro (ADMELOG) corrective regimen sliding scale   SubCutaneous at bedtime  insulin lispro (ADMELOG) corrective regimen sliding scale.   SubCutaneous every 8 hours  pantoprazole    Tablet 40 milliGRAM(s) Oral before breakfast  sodium chloride 0.9%. 1000 milliLiter(s) (75 mL/Hr) IV Continuous <Continuous>    MEDICATIONS  (PRN):  ondansetron Injectable 4 milliGRAM(s) IV Push once PRN Nausea and/or Vomiting      REVIEW OF SYSTEMS:  CONSTITUTIONAL: No fever, weight loss, or fatigue  EYES: No eye pain, visual disturbances, or discharge  ENMT:  No difficulty hearing, tinnitus, vertigo; No sinus or throat pain  NECK: No pain or stiffness  RESPIRATORY: No cough, wheezing, chills or hemoptysis; No shortness of breath  CARDIOVASCULAR: No chest pain, palpitations, dizziness, or leg swelling  GASTROINTESTINAL: No abdominal or epigastric pain. No nausea, vomiting, or hematemesis; No diarrhea or constipation. No melena or hematochezia.  GENITOURINARY: No dysuria, frequency, hematuria, or incontinence  NEUROLOGICAL: No headaches, memory loss, loss of strength, numbness, or tremors  SKIN: No itching, burning, rashes, or lesions   LYMPH NODES: No enlarged glands  ENDOCRINE: No heat or cold intolerance; No hair loss  MUSCULOSKELETAL: No joint pain or swelling; No muscle, back, or extremity pain  PSYCHIATRIC: No depression, anxiety, mood swings, or difficulty sleeping  HEME/LYMPH: No easy bruising, or bleeding gums  ALLERY AND IMMUNOLOGIC: No hives or eczema    PHYSICAL EXAM:  GENERAL: NAD, well-groomed, well-developed  HEAD:  Atraumatic, Normocephalic  EYES: EOMI, PERRLA, conjunctiva and sclera clear  ENMT: No tonsillar erythema, exudates, or enlargement; Moist mucous membranes, Good dentition, No lesions  NECK: Supple, No JVD, Normal thyroid  NERVOUS SYSTEM:  Alert & Oriented X3, Good concentration; Motor Strength 5/5 B/L upper and lower extremities; DTRs 2+ intact and symmetric  CHEST/LUNG: Clear to percussion bilaterally; No rales, rhonchi, wheezing, or rubs  HEART: Regular rate and rhythm; No murmurs, rubs, or gallops  ABDOMEN: Soft, Nontender, Nondistended; Bowel sounds present  EXTREMITIES:  2+ Peripheral Pulses, No clubbing, cyanosis, or edema  LYMPH: No lymphadenopathy noted  SKIN: No rashes or lesions    RADIOLOGY & ADDITIONAL TESTS:  < from: CT Abdomen and Pelvis w/ IV Cont (07.01.22 @ 00:59) >  IMPRESSION:  No bowel obstruction, wall thickening or inflammatory change.    Mildly dilated common bile duct with question of distal stone. Consider   follow-up with MRCP for further evaluation.      < end of copied text >    Imaging Personally Reviewed:  [x ] YES  [ ] NO    Consultant(s) Notes Reviewed:  [ x] YES  [ ] NO        Care Discussed with Consultants/Other Providers [ x] YES  [ ] NO

## 2022-07-01 NOTE — CONSULT NOTE ADULT - ASSESSMENT
1. Diarrhea  2. Gastroenteritis  3. Dilated CBD with ? CBD stone  4. Fatty liver    Suggestions:    1. Check stool for culture and O&P  2. Monitor electrolytes  3. MRCP  4. Monitor LFT'S  5. Protonix daily  6. Avoid NSAID  7. DVT prophylaxis

## 2022-07-01 NOTE — H&P ADULT - PROBLEM SELECTOR PLAN 1
Patient coming in with 1 day hx of diarrhea and vomiting likely due to gastroenteritis  c/w symptomatic treatment  zofran for n/v  IVF 75cc/hr  NPO for bowel rest   Tylenol PRN for pain  f/u BMP

## 2022-07-01 NOTE — H&P ADULT - PROBLEM SELECTOR PLAN 2
CT Abd: Mildly dilated common bile duct with question of distal stone.  hx of cholecystectomy in past  GI consult in AM     CT Abd: Mildly dilated common bile duct with question of distal stone.  hx of cholecystectomy in past  GI consulted Dr. Chase

## 2022-07-02 LAB
ALBUMIN SERPL ELPH-MCNC: 2.5 G/DL — LOW (ref 3.5–5)
ALP SERPL-CCNC: 82 U/L — SIGNIFICANT CHANGE UP (ref 40–120)
ALT FLD-CCNC: 141 U/L DA — HIGH (ref 10–60)
ANION GAP SERPL CALC-SCNC: 10 MMOL/L — SIGNIFICANT CHANGE UP (ref 5–17)
AST SERPL-CCNC: 31 U/L — SIGNIFICANT CHANGE UP (ref 10–40)
BILIRUB SERPL-MCNC: 0.4 MG/DL — SIGNIFICANT CHANGE UP (ref 0.2–1.2)
BUN SERPL-MCNC: 7 MG/DL — SIGNIFICANT CHANGE UP (ref 7–18)
CALCIUM SERPL-MCNC: 8.2 MG/DL — LOW (ref 8.4–10.5)
CHLORIDE SERPL-SCNC: 111 MMOL/L — HIGH (ref 96–108)
CO2 SERPL-SCNC: 19 MMOL/L — LOW (ref 22–31)
CREAT SERPL-MCNC: 0.38 MG/DL — LOW (ref 0.5–1.3)
EGFR: 119 ML/MIN/1.73M2 — SIGNIFICANT CHANGE UP
GLUCOSE BLDC GLUCOMTR-MCNC: 122 MG/DL — HIGH (ref 70–99)
GLUCOSE BLDC GLUCOMTR-MCNC: 124 MG/DL — HIGH (ref 70–99)
GLUCOSE BLDC GLUCOMTR-MCNC: 128 MG/DL — HIGH (ref 70–99)
GLUCOSE BLDC GLUCOMTR-MCNC: 151 MG/DL — HIGH (ref 70–99)
GLUCOSE SERPL-MCNC: 154 MG/DL — HIGH (ref 70–99)
HCT VFR BLD CALC: 39.6 % — SIGNIFICANT CHANGE UP (ref 34.5–45)
HGB BLD-MCNC: 13.6 G/DL — SIGNIFICANT CHANGE UP (ref 11.5–15.5)
MCHC RBC-ENTMCNC: 30.5 PG — SIGNIFICANT CHANGE UP (ref 27–34)
MCHC RBC-ENTMCNC: 34.3 GM/DL — SIGNIFICANT CHANGE UP (ref 32–36)
MCV RBC AUTO: 88.8 FL — SIGNIFICANT CHANGE UP (ref 80–100)
NRBC # BLD: 0 /100 WBCS — SIGNIFICANT CHANGE UP (ref 0–0)
PHOSPHATE SERPL-MCNC: 4.6 MG/DL — HIGH (ref 2.5–4.5)
PLATELET # BLD AUTO: 191 K/UL — SIGNIFICANT CHANGE UP (ref 150–400)
POTASSIUM SERPL-MCNC: 3.6 MMOL/L — SIGNIFICANT CHANGE UP (ref 3.5–5.3)
POTASSIUM SERPL-SCNC: 3.6 MMOL/L — SIGNIFICANT CHANGE UP (ref 3.5–5.3)
PROT SERPL-MCNC: 6 G/DL — SIGNIFICANT CHANGE UP (ref 6–8.3)
RBC # BLD: 4.46 M/UL — SIGNIFICANT CHANGE UP (ref 3.8–5.2)
RBC # FLD: 13.2 % — SIGNIFICANT CHANGE UP (ref 10.3–14.5)
SODIUM SERPL-SCNC: 140 MMOL/L — SIGNIFICANT CHANGE UP (ref 135–145)
WBC # BLD: 5.36 K/UL — SIGNIFICANT CHANGE UP (ref 3.8–10.5)
WBC # FLD AUTO: 5.36 K/UL — SIGNIFICANT CHANGE UP (ref 3.8–10.5)

## 2022-07-02 RX ORDER — INSULIN LISPRO 100/ML
VIAL (ML) SUBCUTANEOUS AT BEDTIME
Refills: 0 | Status: DISCONTINUED | OUTPATIENT
Start: 2022-07-02 | End: 2022-07-06

## 2022-07-02 RX ORDER — INSULIN LISPRO 100/ML
VIAL (ML) SUBCUTANEOUS
Refills: 0 | Status: DISCONTINUED | OUTPATIENT
Start: 2022-07-02 | End: 2022-07-06

## 2022-07-02 RX ADMIN — SODIUM CHLORIDE 75 MILLILITER(S): 9 INJECTION INTRAMUSCULAR; INTRAVENOUS; SUBCUTANEOUS at 04:01

## 2022-07-02 RX ADMIN — PANTOPRAZOLE SODIUM 40 MILLIGRAM(S): 20 TABLET, DELAYED RELEASE ORAL at 05:59

## 2022-07-02 RX ADMIN — ATORVASTATIN CALCIUM 20 MILLIGRAM(S): 80 TABLET, FILM COATED ORAL at 22:16

## 2022-07-02 RX ADMIN — SODIUM CHLORIDE 75 MILLILITER(S): 9 INJECTION INTRAMUSCULAR; INTRAVENOUS; SUBCUTANEOUS at 19:54

## 2022-07-02 RX ADMIN — Medication 1: at 07:46

## 2022-07-02 NOTE — PROGRESS NOTE ADULT - PROBLEM SELECTOR PLAN 3
monitor LFTs  hepatitis profile  Abd sono  GI eval. monitor LFTs  hepatitis profile  Abd sono  GI follow up

## 2022-07-02 NOTE — PROGRESS NOTE ADULT - SUBJECTIVE AND OBJECTIVE BOX
pt seen in icu [  ], reg med floor [   ], bed [  ], chair at bedside [   ]  Awake, alert, comfortable in bed in NAD.    REVIEW OF SYSTEMS:    CONSTITUTIONAL: No weakness, fevers or chills  EYES/ENT: No visual changes;  No vertigo or throat pain   NECK: No pain or stiffness  RESPIRATORY: No cough, wheezing, hemoptysis; No shortness of breath  CARDIOVASCULAR: No chest pain or palpitations  GASTROINTESTINAL: No abdominal or epigastric pain. No nausea, vomiting, or hematemesis; No diarrhea or constipation. No melena or hematochezia.  GENITOURINARY: No dysuria, frequency or hematuria  NEUROLOGICAL: No numbness or weakness  SKIN: No itching, burning, rashes, or lesions   All other review of systems is negative unless indicated above.    Physical Exam    General: WN/WD NAD  Neurology: A&Ox3, nonfocal, PARDO x 4  Respiratory: CTA B/L  CV: RRR, S1S2, no murmurs, rubs or gallops  Abdominal: Soft, NT, ND +BS, Last BM  Extremities: No edema, + peripheral pulses      Allergies  No Known Allergies      Health Issues  Calculus of bile duct without obstruction, cholangitis, or cholecystitis    Diabetes mellitus    Hypertension    S/P cholecystectomy        Vitals  T(F): 98.5 (07-02-22 @ 05:12), Max: 98.5 (07-01-22 @ 06:09)  HR: 74 (07-02-22 @ 05:12) (67 - 78)  BP: 104/64 (07-02-22 @ 05:12) (92/55 - 108/55)  RR: 18 (07-02-22 @ 05:12) (16 - 18)  SpO2: 96% (07-02-22 @ 05:12) (96% - 100%)  Wt(kg): --  CAPILLARY BLOOD GLUCOSE      POCT Blood Glucose.: 146 mg/dL (01 Jul 2022 21:33)      Labs                          13.9   4.73  )-----------( 180      ( 01 Jul 2022 10:56 )             40.6       07-01    139  |  107  |  10  ----------------------------<  174<H>  2.9<LL>   |  23  |  0.36<L>    Ca    8.1<L>      01 Jul 2022 10:56  Phos  1.9     07-01  Mg     2.0     07-01    TPro  6.1  /  Alb  2.7<L>  /  TBili  0.3  /  DBili  x   /  AST  53<H>  /  ALT  208<H>  /  AlkPhos  95  07-01            Radiology Results      Meds    MEDICATIONS  (STANDING):  atorvastatin 20 milliGRAM(s) Oral at bedtime  dextrose 5%. 1000 milliLiter(s) (50 mL/Hr) IV Continuous <Continuous>  enoxaparin Injectable 40 milliGRAM(s) SubCutaneous every 24 hours  glucagon  Injectable 1 milliGRAM(s) IntraMuscular once  insulin lispro (ADMELOG) corrective regimen sliding scale   SubCutaneous three times a day before meals  insulin lispro (ADMELOG) corrective regimen sliding scale   SubCutaneous at bedtime  pantoprazole    Tablet 40 milliGRAM(s) Oral before breakfast  sodium chloride 0.9%. 1000 milliLiter(s) (75 mL/Hr) IV Continuous <Continuous>      MEDICATIONS  (PRN):  ondansetron Injectable 4 milliGRAM(s) IV Push once PRN Nausea and/or Vomiting         pt seen in icu [  ], reg med floor [ x  ], bed [ x ], chair at bedside [   ]  Awake, alert, comfortable in bed in NAD. Still with diarrhea    REVIEW OF SYSTEMS:    CONSTITUTIONAL: No weakness, fevers or chills  EYES/ENT: No visual changes;  No vertigo or throat pain   NECK: No pain or stiffness  RESPIRATORY: No cough, wheezing, hemoptysis; No shortness of breath  CARDIOVASCULAR: No chest pain or palpitations  GASTROINTESTINAL: No abdominal or epigastric pain. No nausea, vomiting, or hematemesis; + diarrhea no constipation. No melena or hematochezia.  GENITOURINARY: No dysuria, frequency or hematuria  NEUROLOGICAL: No numbness or weakness  SKIN: No itching, burning, rashes, or lesions   All other review of systems is negative unless indicated above.    Physical Exam    General: WN/WD NAD  Neurology: A&Ox3, nonfocal, PARDO x 4  Respiratory: CTA B/L  CV: RRR, S1S2, no murmurs, rubs or gallops  Abdominal: Soft, NT, ND +BS, Last BM  Extremities: No edema, + peripheral pulses      Allergies  No Known Allergies      Health Issues  Calculus of bile duct without obstruction, cholangitis, or cholecystitis    Diabetes mellitus    Hypertension    S/P cholecystectomy        Vitals  T(F): 98.5 (07-02-22 @ 05:12), Max: 98.5 (07-01-22 @ 06:09)  HR: 74 (07-02-22 @ 05:12) (67 - 78)  BP: 104/64 (07-02-22 @ 05:12) (92/55 - 108/55)  RR: 18 (07-02-22 @ 05:12) (16 - 18)  SpO2: 96% (07-02-22 @ 05:12) (96% - 100%)  Wt(kg): --  CAPILLARY BLOOD GLUCOSE      POCT Blood Glucose.: 146 mg/dL (01 Jul 2022 21:33)      Labs                          13.9   4.73  )-----------( 180      ( 01 Jul 2022 10:56 )             40.6       07-01    139  |  107  |  10  ----------------------------<  174<H>  2.9<LL>   |  23  |  0.36<L>    Ca    8.1<L>      01 Jul 2022 10:56  Phos  1.9     07-01  Mg     2.0     07-01    TPro  6.1  /  Alb  2.7<L>  /  TBili  0.3  /  DBili  x   /  AST  53<H>  /  ALT  208<H>  /  AlkPhos  95  07-01            Radiology Results      Meds    MEDICATIONS  (STANDING):  atorvastatin 20 milliGRAM(s) Oral at bedtime  dextrose 5%. 1000 milliLiter(s) (50 mL/Hr) IV Continuous <Continuous>  enoxaparin Injectable 40 milliGRAM(s) SubCutaneous every 24 hours  glucagon  Injectable 1 milliGRAM(s) IntraMuscular once  insulin lispro (ADMELOG) corrective regimen sliding scale   SubCutaneous three times a day before meals  insulin lispro (ADMELOG) corrective regimen sliding scale   SubCutaneous at bedtime  pantoprazole    Tablet 40 milliGRAM(s) Oral before breakfast  sodium chloride 0.9%. 1000 milliLiter(s) (75 mL/Hr) IV Continuous <Continuous>      MEDICATIONS  (PRN):  ondansetron Injectable 4 milliGRAM(s) IV Push once PRN Nausea and/or Vomiting

## 2022-07-03 LAB
ALBUMIN SERPL ELPH-MCNC: 2.6 G/DL — LOW (ref 3.5–5)
ALP SERPL-CCNC: 79 U/L — SIGNIFICANT CHANGE UP (ref 40–120)
ALT FLD-CCNC: 110 U/L DA — HIGH (ref 10–60)
ANION GAP SERPL CALC-SCNC: 9 MMOL/L — SIGNIFICANT CHANGE UP (ref 5–17)
AST SERPL-CCNC: 28 U/L — SIGNIFICANT CHANGE UP (ref 10–40)
BILIRUB DIRECT SERPL-MCNC: 0.1 MG/DL — SIGNIFICANT CHANGE UP (ref 0–0.3)
BILIRUB SERPL-MCNC: 0.6 MG/DL — SIGNIFICANT CHANGE UP (ref 0.2–1.2)
BUN SERPL-MCNC: 6 MG/DL — LOW (ref 7–18)
CALCIUM SERPL-MCNC: 8.5 MG/DL — SIGNIFICANT CHANGE UP (ref 8.4–10.5)
CHLORIDE SERPL-SCNC: 109 MMOL/L — HIGH (ref 96–108)
CO2 SERPL-SCNC: 23 MMOL/L — SIGNIFICANT CHANGE UP (ref 22–31)
CREAT SERPL-MCNC: 0.34 MG/DL — LOW (ref 0.5–1.3)
CULTURE RESULTS: SIGNIFICANT CHANGE UP
EGFR: 122 ML/MIN/1.73M2 — SIGNIFICANT CHANGE UP
GLUCOSE BLDC GLUCOMTR-MCNC: 114 MG/DL — HIGH (ref 70–99)
GLUCOSE BLDC GLUCOMTR-MCNC: 126 MG/DL — HIGH (ref 70–99)
GLUCOSE BLDC GLUCOMTR-MCNC: 129 MG/DL — HIGH (ref 70–99)
GLUCOSE BLDC GLUCOMTR-MCNC: 95 MG/DL — SIGNIFICANT CHANGE UP (ref 70–99)
GLUCOSE SERPL-MCNC: 133 MG/DL — HIGH (ref 70–99)
HCT VFR BLD CALC: 41.1 % — SIGNIFICANT CHANGE UP (ref 34.5–45)
HGB BLD-MCNC: 14 G/DL — SIGNIFICANT CHANGE UP (ref 11.5–15.5)
MCHC RBC-ENTMCNC: 30.2 PG — SIGNIFICANT CHANGE UP (ref 27–34)
MCHC RBC-ENTMCNC: 34.1 GM/DL — SIGNIFICANT CHANGE UP (ref 32–36)
MCV RBC AUTO: 88.8 FL — SIGNIFICANT CHANGE UP (ref 80–100)
NRBC # BLD: 0 /100 WBCS — SIGNIFICANT CHANGE UP (ref 0–0)
PLATELET # BLD AUTO: 197 K/UL — SIGNIFICANT CHANGE UP (ref 150–400)
POTASSIUM SERPL-MCNC: 3.8 MMOL/L — SIGNIFICANT CHANGE UP (ref 3.5–5.3)
POTASSIUM SERPL-SCNC: 3.8 MMOL/L — SIGNIFICANT CHANGE UP (ref 3.5–5.3)
PROT SERPL-MCNC: 6.1 G/DL — SIGNIFICANT CHANGE UP (ref 6–8.3)
RBC # BLD: 4.63 M/UL — SIGNIFICANT CHANGE UP (ref 3.8–5.2)
RBC # FLD: 13 % — SIGNIFICANT CHANGE UP (ref 10.3–14.5)
SODIUM SERPL-SCNC: 141 MMOL/L — SIGNIFICANT CHANGE UP (ref 135–145)
SPECIMEN SOURCE: SIGNIFICANT CHANGE UP
WBC # BLD: 4.79 K/UL — SIGNIFICANT CHANGE UP (ref 3.8–10.5)
WBC # FLD AUTO: 4.79 K/UL — SIGNIFICANT CHANGE UP (ref 3.8–10.5)

## 2022-07-03 RX ADMIN — SODIUM CHLORIDE 75 MILLILITER(S): 9 INJECTION INTRAMUSCULAR; INTRAVENOUS; SUBCUTANEOUS at 09:35

## 2022-07-03 RX ADMIN — ATORVASTATIN CALCIUM 20 MILLIGRAM(S): 80 TABLET, FILM COATED ORAL at 21:57

## 2022-07-03 RX ADMIN — SODIUM CHLORIDE 75 MILLILITER(S): 9 INJECTION INTRAMUSCULAR; INTRAVENOUS; SUBCUTANEOUS at 21:58

## 2022-07-03 RX ADMIN — PANTOPRAZOLE SODIUM 40 MILLIGRAM(S): 20 TABLET, DELAYED RELEASE ORAL at 05:44

## 2022-07-03 RX ADMIN — ENOXAPARIN SODIUM 40 MILLIGRAM(S): 100 INJECTION SUBCUTANEOUS at 05:44

## 2022-07-03 NOTE — PROGRESS NOTE ADULT - SUBJECTIVE AND OBJECTIVE BOX
pt seen in icu [  ], reg med floor [x   ], bed [x  ], chair at bedside [   ]  Awake, alert, comfortable in bed in NAD. Still having diarrhea. Awaiting MRCP  REVIEW OF SYSTEMS:    CONSTITUTIONAL: No weakness, fevers or chills  EYES/ENT: No visual changes;  No vertigo or throat pain   NECK: No pain or stiffness  RESPIRATORY: No cough, wheezing, hemoptysis; No shortness of breath  CARDIOVASCULAR: No chest pain or palpitations  GASTROINTESTINAL: No abdominal or epigastric pain. No nausea, vomiting, or hematemesis; + diarrhea no constipation. No melena or hematochezia.  GENITOURINARY: No dysuria, frequency or hematuria  NEUROLOGICAL: No numbness or weakness  SKIN: No itching, burning, rashes, or lesions   All other review of systems is negative unless indicated above.    Physical Exam    General: WN/WD NAD  Neurology: A&Ox3, nonfocal, PARDO x 4  Respiratory: CTA B/L  CV: RRR, S1S2, no murmurs, rubs or gallops  Abdominal: Soft, NT, ND +BS, Last BM  Extremities: No edema, + peripheral pulses      Allergies  No Known Allergies      Health Issues  Calculus of bile duct without obstruction, cholangitis, or cholecystitis    Diabetes mellitus    Hypertension    S/P cholecystectomy        Vitals  T(F): 97.5 (07-03-22 @ 05:09), Max: 98.6 (07-02-22 @ 21:10)  HR: 66 (07-03-22 @ 05:09) (64 - 67)  BP: 111/65 (07-03-22 @ 05:09) (111/65 - 119/64)  RR: 18 (07-03-22 @ 05:09) (16 - 18)  SpO2: 97% (07-03-22 @ 05:09) (97% - 100%)  Wt(kg): --  CAPILLARY BLOOD GLUCOSE      POCT Blood Glucose.: 129 mg/dL (03 Jul 2022 07:38)      Labs                          14.0   4.79  )-----------( 197      ( 03 Jul 2022 07:35 )             41.1       07-03    141  |  109<H>  |  6<L>  ----------------------------<  133<H>  3.8   |  23  |  0.34<L>    Ca    8.5      03 Jul 2022 07:35  Phos  4.6     07-02  Mg     2.0     07-01    TPro  6.1  /  Alb  2.6<L>  /  TBili  0.6  /  DBili  0.1  /  AST  28  /  ALT  110<H>  /  AlkPhos  79  07-03            Radiology Results      Meds    MEDICATIONS  (STANDING):  atorvastatin 20 milliGRAM(s) Oral at bedtime  dextrose 5%. 1000 milliLiter(s) (50 mL/Hr) IV Continuous <Continuous>  enoxaparin Injectable 40 milliGRAM(s) SubCutaneous every 24 hours  glucagon  Injectable 1 milliGRAM(s) IntraMuscular once  insulin lispro (ADMELOG) corrective regimen sliding scale   SubCutaneous three times a day before meals  insulin lispro (ADMELOG) corrective regimen sliding scale   SubCutaneous at bedtime  pantoprazole    Tablet 40 milliGRAM(s) Oral before breakfast  sodium chloride 0.9%. 1000 milliLiter(s) (75 mL/Hr) IV Continuous <Continuous>      MEDICATIONS  (PRN):  ondansetron Injectable 4 milliGRAM(s) IV Push once PRN Nausea and/or Vomiting

## 2022-07-03 NOTE — PROGRESS NOTE ADULT - SUBJECTIVE AND OBJECTIVE BOX
[   ] ICU                                          [   ] CCU                                      [  X ] Medical Floor    Patient is a 52 year old female with diarrhea and dilated CBD. GI consulted to evaluate.        52 year old female with a past medical history significant for DM and HLD and surgical history of cholecystectomy presented to the emergency room with 2 days history of watery, non bloody diarrhea associated with intermittent crampy non radiating, 5-6/10 intensity periumbilical abdominal painone episode of non bloody vomiting. Patient denies hematemesis, hematochezia, melena, fever, chills, chest pain, SOB, cough, hematuria, dysuria or recent traveling or antibiotic use.      Patient appears comfortable. No new complaints reported, No abdominal pain, N/V, hematemesis, hematochezia, melena, fever, chills, chest pain, SOB, cough or diarrhea reported.      PAIN MANAGEMENT:  Pain Scale:                 2/10  Pain Location:  Periumbilical abdominal pain      Prior Colonoscopy: Unknown      PAST MEDICAL HISTORY  Diabetes mellitus  Hypertension      PAST SURGICAL HISTORY  Cholecystectomy        Allergies    No Known Allergies    Intolerances  None         MEDICATIONS  (STANDING):  atorvastatin 20 milliGRAM(s) Oral at bedtime  dextrose 5%. 1000 milliLiter(s) (50 mL/Hr) IV Continuous <Continuous>  enoxaparin Injectable 40 milliGRAM(s) SubCutaneous every 24 hours  glucagon  Injectable 1 milliGRAM(s) IntraMuscular once  insulin lispro (ADMELOG) corrective regimen sliding scale   SubCutaneous at bedtime  insulin lispro (ADMELOG) corrective regimen sliding scale.   SubCutaneous every 8 hours  pantoprazole    Tablet 40 milliGRAM(s) Oral before breakfast  potassium chloride  10 mEq/100 mL IVPB 10 milliEquivalent(s) IV Intermittent every 1 hour  potassium phosphate IVPB 30 milliMole(s) IV Intermittent once  sodium chloride 0.9%. 1000 milliLiter(s) (75 mL/Hr) IV Continuous <Continuous>    MEDICATIONS  (PRN):  ondansetron Injectable 4 milliGRAM(s) IV Push once PRN Nausea and/or Vomiting      SOCIAL HISTORY  Advanced Directives:       [ X ] Full Code       [  ] DNR  Marital Status:         [  ] M      [ X ] S      [  ] D       [  ] W  Children:       [ X ] Yes      [  ] No  Occupation:        [  ] Employed       [X  ] Unemployed       [  ] Retired  Diet:       [ X ] Regular       [  ] PEG feeding          [  ] NG tube feeding  Drug Use:           [ X ] Patient denied          [  ] Yes  Alcohol:           [ X ] No             [  ] Yes (socially)         [  ] Yes (chronic)  Tobacco:           [  ] Yes           [ X ] No      FAMILY HISTORY  [ X ] Heart Disease            [ X ] Diabetes             [ X ] HTN             [  ] Colon Cancer             [  ] Stomach Cancer              [  ] Pancreatic Cancer        VITALS  Vital Signs Last 24 Hrs  T(C): 36.6 (03 Jul 2022 21:30), Max: 36.8 (03 Jul 2022 14:10)  T(F): 97.9 (03 Jul 2022 21:30), Max: 98.2 (03 Jul 2022 14:10)  HR: 57 (03 Jul 2022 21:30) (57 - 66)  BP: 126/56 (03 Jul 2022 21:30) (111/65 - 126/56)  BP(mean): 72 (03 Jul 2022 21:30) (72 - 74)  RR: 16 (03 Jul 2022 21:30) (16 - 18)  SpO2: 99% (03 Jul 2022 21:30) (97% - 100%)       MEDICATIONS  (STANDING):  atorvastatin 20 milliGRAM(s) Oral at bedtime  dextrose 5%. 1000 milliLiter(s) (50 mL/Hr) IV Continuous <Continuous>  enoxaparin Injectable 40 milliGRAM(s) SubCutaneous every 24 hours  glucagon  Injectable 1 milliGRAM(s) IntraMuscular once  insulin lispro (ADMELOG) corrective regimen sliding scale   SubCutaneous three times a day before meals  insulin lispro (ADMELOG) corrective regimen sliding scale   SubCutaneous at bedtime  pantoprazole    Tablet 40 milliGRAM(s) Oral before breakfast  sodium chloride 0.9%. 1000 milliLiter(s) (75 mL/Hr) IV Continuous <Continuous>    MEDICATIONS  (PRN):  ondansetron Injectable 4 milliGRAM(s) IV Push once PRN Nausea and/or Vomiting                            14.0   4.79  )-----------( 197      ( 03 Jul 2022 07:35 )             41.1       07-03    141  |  109<H>  |  6<L>  ----------------------------<  133<H>  3.8   |  23  |  0.34<L>    Ca    8.5      03 Jul 2022 07:35  Phos  4.6     07-02    TPro  6.1  /  Alb  2.6<L>  /  TBili  0.6  /  DBili  0.1  /  AST  28  /  ALT  110<H>  /  AlkPhos  79  07-03

## 2022-07-04 LAB
ALBUMIN SERPL ELPH-MCNC: 2.8 G/DL — LOW (ref 3.5–5)
ALP SERPL-CCNC: 75 U/L — SIGNIFICANT CHANGE UP (ref 40–120)
ALT FLD-CCNC: 96 U/L DA — HIGH (ref 10–60)
ANION GAP SERPL CALC-SCNC: 12 MMOL/L — SIGNIFICANT CHANGE UP (ref 5–17)
AST SERPL-CCNC: 34 U/L — SIGNIFICANT CHANGE UP (ref 10–40)
BILIRUB SERPL-MCNC: 0.6 MG/DL — SIGNIFICANT CHANGE UP (ref 0.2–1.2)
BUN SERPL-MCNC: 7 MG/DL — SIGNIFICANT CHANGE UP (ref 7–18)
CALCIUM SERPL-MCNC: 8.5 MG/DL — SIGNIFICANT CHANGE UP (ref 8.4–10.5)
CHLORIDE SERPL-SCNC: 108 MMOL/L — SIGNIFICANT CHANGE UP (ref 96–108)
CO2 SERPL-SCNC: 22 MMOL/L — SIGNIFICANT CHANGE UP (ref 22–31)
CREAT SERPL-MCNC: 0.31 MG/DL — LOW (ref 0.5–1.3)
EGFR: 125 ML/MIN/1.73M2 — SIGNIFICANT CHANGE UP
GLUCOSE BLDC GLUCOMTR-MCNC: 112 MG/DL — HIGH (ref 70–99)
GLUCOSE BLDC GLUCOMTR-MCNC: 199 MG/DL — HIGH (ref 70–99)
GLUCOSE BLDC GLUCOMTR-MCNC: 99 MG/DL — SIGNIFICANT CHANGE UP (ref 70–99)
GLUCOSE BLDC GLUCOMTR-MCNC: 99 MG/DL — SIGNIFICANT CHANGE UP (ref 70–99)
GLUCOSE SERPL-MCNC: 115 MG/DL — HIGH (ref 70–99)
HCT VFR BLD CALC: 41.5 % — SIGNIFICANT CHANGE UP (ref 34.5–45)
HGB BLD-MCNC: 14.2 G/DL — SIGNIFICANT CHANGE UP (ref 11.5–15.5)
MCHC RBC-ENTMCNC: 30.1 PG — SIGNIFICANT CHANGE UP (ref 27–34)
MCHC RBC-ENTMCNC: 34.2 GM/DL — SIGNIFICANT CHANGE UP (ref 32–36)
MCV RBC AUTO: 88.1 FL — SIGNIFICANT CHANGE UP (ref 80–100)
NRBC # BLD: 0 /100 WBCS — SIGNIFICANT CHANGE UP (ref 0–0)
PLATELET # BLD AUTO: 200 K/UL — SIGNIFICANT CHANGE UP (ref 150–400)
POTASSIUM SERPL-MCNC: 3.3 MMOL/L — LOW (ref 3.5–5.3)
POTASSIUM SERPL-SCNC: 3.3 MMOL/L — LOW (ref 3.5–5.3)
PROT SERPL-MCNC: 6.2 G/DL — SIGNIFICANT CHANGE UP (ref 6–8.3)
RBC # BLD: 4.71 M/UL — SIGNIFICANT CHANGE UP (ref 3.8–5.2)
RBC # FLD: 12.5 % — SIGNIFICANT CHANGE UP (ref 10.3–14.5)
SODIUM SERPL-SCNC: 142 MMOL/L — SIGNIFICANT CHANGE UP (ref 135–145)
WBC # BLD: 5.44 K/UL — SIGNIFICANT CHANGE UP (ref 3.8–10.5)
WBC # FLD AUTO: 5.44 K/UL — SIGNIFICANT CHANGE UP (ref 3.8–10.5)

## 2022-07-04 PROCEDURE — 74181 MRI ABDOMEN W/O CONTRAST: CPT | Mod: 26

## 2022-07-04 RX ORDER — POTASSIUM CHLORIDE 20 MEQ
40 PACKET (EA) ORAL ONCE
Refills: 0 | Status: COMPLETED | OUTPATIENT
Start: 2022-07-04 | End: 2022-07-04

## 2022-07-04 RX ADMIN — Medication 40 MILLIEQUIVALENT(S): at 13:54

## 2022-07-04 RX ADMIN — ENOXAPARIN SODIUM 40 MILLIGRAM(S): 100 INJECTION SUBCUTANEOUS at 05:31

## 2022-07-04 RX ADMIN — ATORVASTATIN CALCIUM 20 MILLIGRAM(S): 80 TABLET, FILM COATED ORAL at 21:33

## 2022-07-04 RX ADMIN — PANTOPRAZOLE SODIUM 40 MILLIGRAM(S): 20 TABLET, DELAYED RELEASE ORAL at 05:31

## 2022-07-04 NOTE — PROGRESS NOTE ADULT - SUBJECTIVE AND OBJECTIVE BOX
pt seen in icu [  ], reg med floor [   ], bed [  ], chair at bedside [   ]  Awake, alert, comfortable in bed in NAD. Still having diarrhea. Awaiting MRCP.    REVIEW OF SYSTEMS:    REVIEW OF SYSTEMS:    CONSTITUTIONAL: No weakness, fevers or chills  EYES/ENT: No visual changes;  No vertigo or throat pain   NECK: No pain or stiffness  RESPIRATORY: No cough, wheezing, hemoptysis; No shortness of breath  CARDIOVASCULAR: No chest pain or palpitations  GASTROINTESTINAL: No abdominal or epigastric pain. No nausea, vomiting, or hematemesis; No diarrhea or constipation. No melena or hematochezia.  GENITOURINARY: No dysuria, frequency or hematuria  NEUROLOGICAL: No numbness or weakness  SKIN: No itching, burning, rashes, or lesions   All other review of systems is negative unless indicated above.    Physical Exam    General: WN/WD NAD  Neurology: A&Ox3, nonfocal, PARDO x 4  Respiratory: CTA B/L  CV: RRR, S1S2, no murmurs, rubs or gallops  Abdominal: Soft, NT, ND +BS, Last BM  Extremities: No edema, + peripheral pulses      Allergies  No Known Allergies      Health Issues  Calculus of bile duct without obstruction, cholangitis, or cholecystitis    Diabetes mellitus    Hypertension    S/P cholecystectomy        Vitals  T(F): 97.7 (07-04-22 @ 05:06), Max: 98.2 (07-03-22 @ 14:10)  HR: 61 (07-04-22 @ 05:06) (57 - 62)  BP: 118/69 (07-04-22 @ 05:06) (118/69 - 126/56)  RR: 18 (07-04-22 @ 05:06) (16 - 18)  SpO2: 97% (07-04-22 @ 05:06) (97% - 100%)  Wt(kg): --  CAPILLARY BLOOD GLUCOSE      POCT Blood Glucose.: 112 mg/dL (04 Jul 2022 07:46)      Labs                          14.2   5.44  )-----------( 200      ( 04 Jul 2022 06:42 )             41.5       07-04    142  |  108  |  7   ----------------------------<  115<H>  3.3<L>   |  22  |  0.31<L>    Ca    8.5      04 Jul 2022 06:42    TPro  6.2  /  Alb  2.8<L>  /  TBili  0.6  /  DBili  x   /  AST  34  /  ALT  96<H>  /  AlkPhos  75  07-04            Radiology Results      Meds    MEDICATIONS  (STANDING):  atorvastatin 20 milliGRAM(s) Oral at bedtime  dextrose 5%. 1000 milliLiter(s) (50 mL/Hr) IV Continuous <Continuous>  enoxaparin Injectable 40 milliGRAM(s) SubCutaneous every 24 hours  glucagon  Injectable 1 milliGRAM(s) IntraMuscular once  insulin lispro (ADMELOG) corrective regimen sliding scale   SubCutaneous three times a day before meals  insulin lispro (ADMELOG) corrective regimen sliding scale   SubCutaneous at bedtime  pantoprazole    Tablet 40 milliGRAM(s) Oral before breakfast  sodium chloride 0.9%. 1000 milliLiter(s) (75 mL/Hr) IV Continuous <Continuous>      MEDICATIONS  (PRN):  ondansetron Injectable 4 milliGRAM(s) IV Push once PRN Nausea and/or Vomiting         pt seen in icu [  ], reg med floor [   ], bed [  ], chair at bedside [   ]  Awake, alert, comfortable in bed in NAD. No diarrhea. Awaiting MRCP.    REVIEW OF SYSTEMS:    REVIEW OF SYSTEMS:    CONSTITUTIONAL: No weakness, fevers or chills  EYES/ENT: No visual changes;  No vertigo or throat pain   NECK: No pain or stiffness  RESPIRATORY: No cough, wheezing, hemoptysis; No shortness of breath  CARDIOVASCULAR: No chest pain or palpitations  GASTROINTESTINAL: No abdominal or epigastric pain. No nausea, vomiting, or hematemesis; No diarrhea or constipation. No melena or hematochezia.  GENITOURINARY: No dysuria, frequency or hematuria  NEUROLOGICAL: No numbness or weakness  SKIN: No itching, burning, rashes, or lesions   All other review of systems is negative unless indicated above.    Physical Exam    General: WN/WD NAD  Neurology: A&Ox3, nonfocal, PARDO x 4  Respiratory: CTA B/L  CV: RRR, S1S2, no murmurs, rubs or gallops  Abdominal: Soft, NT, ND +BS, Last BM  Extremities: No edema, + peripheral pulses      Allergies  No Known Allergies      Health Issues  Calculus of bile duct without obstruction, cholangitis, or cholecystitis    Diabetes mellitus    Hypertension    S/P cholecystectomy        Vitals  T(F): 97.7 (07-04-22 @ 05:06), Max: 98.2 (07-03-22 @ 14:10)  HR: 61 (07-04-22 @ 05:06) (57 - 62)  BP: 118/69 (07-04-22 @ 05:06) (118/69 - 126/56)  RR: 18 (07-04-22 @ 05:06) (16 - 18)  SpO2: 97% (07-04-22 @ 05:06) (97% - 100%)  Wt(kg): --  CAPILLARY BLOOD GLUCOSE      POCT Blood Glucose.: 112 mg/dL (04 Jul 2022 07:46)      Labs                          14.2   5.44  )-----------( 200      ( 04 Jul 2022 06:42 )             41.5       07-04    142  |  108  |  7   ----------------------------<  115<H>  3.3<L>   |  22  |  0.31<L>    Ca    8.5      04 Jul 2022 06:42    TPro  6.2  /  Alb  2.8<L>  /  TBili  0.6  /  DBili  x   /  AST  34  /  ALT  96<H>  /  AlkPhos  75  07-04    Ova and Parasites (07.03.22 @ 04:06)   Culture Results:   Testing in progress GI PCR Panel, Stool (07.03.22 @ 04:02)   Culture Results:   Rotavirus A         Radiology Results      Meds    MEDICATIONS  (STANDING):  atorvastatin 20 milliGRAM(s) Oral at bedtime  dextrose 5%. 1000 milliLiter(s) (50 mL/Hr) IV Continuous <Continuous>  enoxaparin Injectable 40 milliGRAM(s) SubCutaneous every 24 hours  glucagon  Injectable 1 milliGRAM(s) IntraMuscular once  insulin lispro (ADMELOG) corrective regimen sliding scale   SubCutaneous three times a day before meals  insulin lispro (ADMELOG) corrective regimen sliding scale   SubCutaneous at bedtime  pantoprazole    Tablet 40 milliGRAM(s) Oral before breakfast  sodium chloride 0.9%. 1000 milliLiter(s) (75 mL/Hr) IV Continuous <Continuous>      MEDICATIONS  (PRN):  ondansetron Injectable 4 milliGRAM(s) IV Push once PRN Nausea and/or Vomiting

## 2022-07-04 NOTE — PROGRESS NOTE ADULT - PROBLEM SELECTOR PLAN 1
Stool exam  IVF  replace lytes  GI follow up Stool exam in progress  IVF  replace lytes  GI follow up

## 2022-07-04 NOTE — CHART NOTE - NSCHARTNOTEFT_GEN_A_CORE
EVENT: Notified by RN that stool PCR is positive for Campylobacter, Plesiomonas, Shingelloides, & Salmonella.    OBJECTIVE:  Vital Signs Last 24 Hrs  T(C): 36.8 (04 Jul 2022 13:00), Max: 36.8 (04 Jul 2022 13:00)  T(F): 98.3 (04 Jul 2022 13:00), Max: 98.3 (04 Jul 2022 13:00)  HR: 58 (04 Jul 2022 13:00) (57 - 61)  BP: 111/55 (04 Jul 2022 13:00) (111/55 - 126/56)  BP(mean): 68 (04 Jul 2022 13:00) (68 - 72)  RR: 16 (04 Jul 2022 13:00) (16 - 18)  SpO2: 100% (04 Jul 2022 13:00) (97% - 100%)    FOCUSED PHYSICAL EXAM:  Neuro: awake, alert, oriented x 3. No neuro deficit  Cardiovascular: Pulses +2 B/L in lower and upper extremities, HR regular, BP stable, No edema.  Respiratory: Respirations regular, unlabored, breath sounds clear B/L.   GI: Abdomen soft, non-tender, positive bowel sounds.        LABS:                        14.2   5.44  )-----------( 200      ( 04 Jul 2022 06:42 )             41.5     07-04    142  |  108  |  7   ----------------------------<  115<H>  3.3<L>   |  22  |  0.31<L>    Ca    8.5      04 Jul 2022 06:42    TPro  6.2  /  Alb  2.8<L>  /  TBili  0.6  /  DBili  x   /  AST  34  /  ALT  96<H>  /  AlkPhos  75  07-04            ASSESSMENT:  A/P  52 year old female with a past medical history of DM and HLD and surgical history of cholecystectomy coming to ED complaining of diarrhea since yesterday morning. Patient states that she has had 10 episodes of non bloody diarrhea episodes associated with 1 episode of non bloody non bilious vomiting.     In ED VS: 97.2, HR 97, /88, RR 16, Spo2 98%  Na 133, ,    CT ABD: Mildly dilated common bile duct with question of distal stone  Received 2L NS bolus in ED     PROBLEM: Stool PCR is positive for Campylobacter, Plesiomonas, Shingelloides, & Salmonella.      PLAN:   1. Levaquin 500 mg PO daily  2. ID Dr. Garcia consulted      FOLLOW UP / RESULT:  - Effectiveness of medication          Spoke with Dr Chase and Dr Garcia on above plans. Attending MD, Dr. Cohen notified by text.

## 2022-07-05 DIAGNOSIS — K52.9 NONINFECTIVE GASTROENTERITIS AND COLITIS, UNSPECIFIED: ICD-10-CM

## 2022-07-05 DIAGNOSIS — K83.8 OTHER SPECIFIED DISEASES OF BILIARY TRACT: ICD-10-CM

## 2022-07-05 DIAGNOSIS — Z02.9 ENCOUNTER FOR ADMINISTRATIVE EXAMINATIONS, UNSPECIFIED: ICD-10-CM

## 2022-07-05 LAB
ANION GAP SERPL CALC-SCNC: 11 MMOL/L — SIGNIFICANT CHANGE UP (ref 5–17)
BUN SERPL-MCNC: 5 MG/DL — LOW (ref 7–18)
CALCIUM SERPL-MCNC: 8.9 MG/DL — SIGNIFICANT CHANGE UP (ref 8.4–10.5)
CHLORIDE SERPL-SCNC: 108 MMOL/L — SIGNIFICANT CHANGE UP (ref 96–108)
CO2 SERPL-SCNC: 22 MMOL/L — SIGNIFICANT CHANGE UP (ref 22–31)
CREAT SERPL-MCNC: 0.34 MG/DL — LOW (ref 0.5–1.3)
CULTURE RESULTS: SIGNIFICANT CHANGE UP
EGFR: 122 ML/MIN/1.73M2 — SIGNIFICANT CHANGE UP
GLUCOSE BLDC GLUCOMTR-MCNC: 137 MG/DL — HIGH (ref 70–99)
GLUCOSE BLDC GLUCOMTR-MCNC: 146 MG/DL — HIGH (ref 70–99)
GLUCOSE BLDC GLUCOMTR-MCNC: 170 MG/DL — HIGH (ref 70–99)
GLUCOSE BLDC GLUCOMTR-MCNC: 193 MG/DL — HIGH (ref 70–99)
GLUCOSE SERPL-MCNC: 155 MG/DL — HIGH (ref 70–99)
HCT VFR BLD CALC: 43.5 % — SIGNIFICANT CHANGE UP (ref 34.5–45)
HGB BLD-MCNC: 14.9 G/DL — SIGNIFICANT CHANGE UP (ref 11.5–15.5)
MCHC RBC-ENTMCNC: 30.5 PG — SIGNIFICANT CHANGE UP (ref 27–34)
MCHC RBC-ENTMCNC: 34.3 GM/DL — SIGNIFICANT CHANGE UP (ref 32–36)
MCV RBC AUTO: 89 FL — SIGNIFICANT CHANGE UP (ref 80–100)
NRBC # BLD: 0 /100 WBCS — SIGNIFICANT CHANGE UP (ref 0–0)
PLATELET # BLD AUTO: 205 K/UL — SIGNIFICANT CHANGE UP (ref 150–400)
POTASSIUM SERPL-MCNC: 3.7 MMOL/L — SIGNIFICANT CHANGE UP (ref 3.5–5.3)
POTASSIUM SERPL-SCNC: 3.7 MMOL/L — SIGNIFICANT CHANGE UP (ref 3.5–5.3)
RBC # BLD: 4.89 M/UL — SIGNIFICANT CHANGE UP (ref 3.8–5.2)
RBC # FLD: 12.8 % — SIGNIFICANT CHANGE UP (ref 10.3–14.5)
SODIUM SERPL-SCNC: 141 MMOL/L — SIGNIFICANT CHANGE UP (ref 135–145)
SPECIMEN SOURCE: SIGNIFICANT CHANGE UP
WBC # BLD: 5.03 K/UL — SIGNIFICANT CHANGE UP (ref 3.8–10.5)
WBC # FLD AUTO: 5.03 K/UL — SIGNIFICANT CHANGE UP (ref 3.8–10.5)

## 2022-07-05 RX ADMIN — ENOXAPARIN SODIUM 40 MILLIGRAM(S): 100 INJECTION SUBCUTANEOUS at 05:21

## 2022-07-05 RX ADMIN — Medication 1: at 16:52

## 2022-07-05 RX ADMIN — ATORVASTATIN CALCIUM 20 MILLIGRAM(S): 80 TABLET, FILM COATED ORAL at 21:21

## 2022-07-05 RX ADMIN — SODIUM CHLORIDE 75 MILLILITER(S): 9 INJECTION INTRAMUSCULAR; INTRAVENOUS; SUBCUTANEOUS at 05:23

## 2022-07-05 RX ADMIN — PANTOPRAZOLE SODIUM 40 MILLIGRAM(S): 20 TABLET, DELAYED RELEASE ORAL at 05:21

## 2022-07-05 NOTE — PROGRESS NOTE ADULT - PROBLEM SELECTOR PLAN 1
Stool exam in progress  IVF  replace lytes  GI follow up Stool exam noted  IVF  replace lytes  GI follow up  ID eval  advance diet and if tolerated, DC planning

## 2022-07-05 NOTE — CONSULT NOTE ADULT - NEGATIVE GASTROINTESTINAL SYMPTOMS
no nausea/no vomiting
no nausea/no vomiting/no constipation/no melena/no hematochezia/no steatorrhea/no jaundice/no hiccoughs

## 2022-07-05 NOTE — DIETITIAN INITIAL EVALUATION ADULT - PERTINENT LABORATORY DATA
07-05    141  |  108  |  5<L>  ----------------------------<  155<H>  3.7   |  22  |  0.34<L>    Ca    8.9      05 Jul 2022 08:06    TPro  6.2  /  Alb  2.8<L>  /  TBili  0.6  /  DBili  x   /  AST  34  /  ALT  96<H>  /  AlkPhos  75  07-04  POCT Blood Glucose.: 137 mg/dL (07-05-22 @ 07:24)  A1C with Estimated Average Glucose Result: 12.9 % (07-01-22 @ 18:57)

## 2022-07-05 NOTE — DIETITIAN INITIAL EVALUATION ADULT - PERSON TAUGHT/METHOD
verbal instruction/written material/skill demonstration/individual instruction/pictorial/ask me 3/patient instructed

## 2022-07-05 NOTE — DIETITIAN INITIAL EVALUATION ADULT - SIGNS/SYMPTOMS
Inadequate oral intake w/diarrhea/vomiting x1day PTA, on clears x5days w/diet advancement at lunch  Lack of prior exposure to accurate information on diabetes/meal planning & A1C 12.9%

## 2022-07-05 NOTE — CONSULT NOTE ADULT - ASSESSMENT
Gastroenteritis - resolved      Plan - cont Levaquin 500mgs po qd x 3-4 days more  DC planning for tomorrow  reconsult prn

## 2022-07-05 NOTE — CONSULT NOTE ADULT - RESPIRATORY
clear to auscultation bilaterally/no wheezes/no rales/no rhonchi/no respiratory distress/no use of accessory muscles/airway patent
clear to auscultation bilaterally/no wheezes/no rales/no rhonchi

## 2022-07-05 NOTE — DIETITIAN INITIAL EVALUATION ADULT - PERTINENT MEDS FT
MEDICATIONS  (STANDING):  atorvastatin 20 milliGRAM(s) Oral at bedtime  dextrose 5%. 1000 milliLiter(s) (50 mL/Hr) IV Continuous <Continuous>  enoxaparin Injectable 40 milliGRAM(s) SubCutaneous every 24 hours  glucagon  Injectable 1 milliGRAM(s) IntraMuscular once  insulin lispro (ADMELOG) corrective regimen sliding scale   SubCutaneous three times a day before meals  insulin lispro (ADMELOG) corrective regimen sliding scale   SubCutaneous at bedtime  levoFLOXacin  Tablet 500 milliGRAM(s) Oral every 24 hours  pantoprazole    Tablet 40 milliGRAM(s) Oral before breakfast  sodium chloride 0.9%. 1000 milliLiter(s) (75 mL/Hr) IV Continuous <Continuous>    MEDICATIONS  (PRN):  ondansetron Injectable 4 milliGRAM(s) IV Push once PRN Nausea and/or Vomiting

## 2022-07-05 NOTE — PROGRESS NOTE ADULT - SUBJECTIVE AND OBJECTIVE BOX
Patient is a 52y old  Female who presents with a chief complaint of Calculus of bile duct without obstruction, cholangitis, or cholecystitis  Seen and examined using  [id number]    OVERNIGHT EVENTS: no acute events overnight, offering no new complaints, diarrhea resolved     REVIEW OF SYSTEMS:  CONSTITUTIONAL: No fever, chills  NECK: No pain or stiffness  RESPIRATORY: No cough, SOB  CARDIOVASCULAR: No chest pain, palpitations  GASTROINTESTINAL: No abdominal pain. No nausea, vomiting, or diarrhea  GENITOURINARY: No dysuria  NEUROLOGICAL: No HA  MUSCULOSKELETAL: No joint pain or swelling; No muscle, back, or extremity pain      T(C): 36.4 (07-05-22 @ 05:14), Max: 36.9 (07-04-22 @ 20:18)  HR: 58 (07-05-22 @ 05:14) (58 - 63)  BP: 125/63 (07-05-22 @ 05:14) (111/55 - 125/63)  RR: 18 (07-05-22 @ 05:14) (16 - 18)  SpO2: 100% (07-05-22 @ 05:14) (100% - 100%)  Wt(kg): --Vital Signs Last 24 Hrs  T(C): 36.4 (05 Jul 2022 05:14), Max: 36.9 (04 Jul 2022 20:18)  T(F): 97.6 (05 Jul 2022 05:14), Max: 98.4 (04 Jul 2022 20:18)  HR: 58 (05 Jul 2022 05:14) (58 - 63)  BP: 125/63 (05 Jul 2022 05:14) (111/55 - 125/63)  BP(mean): 77 (05 Jul 2022 05:14) (68 - 77)  RR: 18 (05 Jul 2022 05:14) (16 - 18)  SpO2: 100% (05 Jul 2022 05:14) (100% - 100%)    MEDICATIONS  (STANDING):  atorvastatin 20 milliGRAM(s) Oral at bedtime  dextrose 5%. 1000 milliLiter(s) (50 mL/Hr) IV Continuous <Continuous>  enoxaparin Injectable 40 milliGRAM(s) SubCutaneous every 24 hours  glucagon  Injectable 1 milliGRAM(s) IntraMuscular once  insulin lispro (ADMELOG) corrective regimen sliding scale   SubCutaneous three times a day before meals  insulin lispro (ADMELOG) corrective regimen sliding scale   SubCutaneous at bedtime  levoFLOXacin  Tablet 500 milliGRAM(s) Oral every 24 hours  pantoprazole    Tablet 40 milliGRAM(s) Oral before breakfast  sodium chloride 0.9%. 1000 milliLiter(s) (75 mL/Hr) IV Continuous <Continuous>    MEDICATIONS  (PRN):  ondansetron Injectable 4 milliGRAM(s) IV Push once PRN Nausea and/or Vomiting      PHYSICAL EXAM:  GENERAL: NAD  EYES: clear conjunctiva  ENMT: Moist mucous membranes  NECK: Supple, No JVD  CHEST/LUNG: Clear to auscultation bilaterally; No rales, rhonchi, wheezing, or rubs  HEART: S1, S2, Regular rate and rhythm  ABDOMEN: Soft, Nontender, Nondistended; Bowel sounds present  NEURO: Alert & Oriented X3  EXTREMITIES: No LE edema, no calf tenderness  SKIN: No rashes or lesions    Consultant(s) Notes Reviewed:  [x ] YES  [ ] NO  Care Discussed with Consultants/Other Providers [ x] YES  [ ] NO    LABS:                        14.9   5.03  )-----------( 205      ( 05 Jul 2022 08:06 )             43.5     07-05    141  |  108  |  5<L>  ----------------------------<  155<H>  3.7   |  22  |  0.34<L>    Ca    8.9      05 Jul 2022 08:06    TPro  6.2  /  Alb  2.8<L>  /  TBili  0.6  /  DBili  x   /  AST  34  /  ALT  96<H>  /  AlkPhos  75  07-04    CAPILLARY BLOOD GLUCOSE  POCT Blood Glucose.: 146 mg/dL (05 Jul 2022 11:25)  POCT Blood Glucose.: 137 mg/dL (05 Jul 2022 07:24)  POCT Blood Glucose.: 199 mg/dL (04 Jul 2022 21:12)  POCT Blood Glucose.: 99 mg/dL (04 Jul 2022 16:30)  POCT Blood Glucose.: 99 mg/dL (04 Jul 2022 12:10)    RADIOLOGY & ADDITIONAL TESTS:    < from:  MRCP No Cont (07.04.22 @ 12:48) >    ACC: [id number] EXAM:  MR MRCP                          PROCEDURE DATE:  07/04/2022          INTERPRETATION:  CLINICAL INFORMATION: Diarrhea. Cholecystectomy.    COMPARISON: Contrast-enhanced CT of the abdomen and pelvis July 1, 2022.    CONTRAST/COMPLICATIONS:  IV Contrast: NONE  Oral Contrast: NONE  Complications: None reported at time of study completion    PROCEDURE:  MRI/MRCP was performed. Radial and 3D MRCP sequences were obtained.      FINDINGS:  LOWER CHEST: Within normal limits.    LIVER: Within normal limits.  BILE DUCTS: There is no intrahepatic biliary dilatation. Again is noted   dilatation of the common bile duct to 10 mm with abrupt tapering at the   level of the ampulla. There is no evidence of filling defect in the   common bile duct to suggest choledocholithiasis.  GALLBLADDER: Removed.  SPLEEN: Within normal limits.  PANCREAS: The pancreatic duct is normal in course and caliber.  ADRENALS: Within normal limits.  KIDNEYS/URETERS: Within normal limits.    VISUALIZED PORTIONS:  BOWEL: Within normal limits.  PERITONEUM: No ascites.  VESSELS: Within normal limits.  RETROPERITONEUM/LYMPH NODES: No lymphadenopathy.  ABDOMINAL WALL: Within normal limits.  BONES: Within normal limits.    IMPRESSION: Mild CBD dilatation, favored to represent a   postcholecystectomy reservoir effect. Correlate with LFTs. No evidence of   choledocholithiasis.    < end of copied text >    Imaging Personally Reviewed:  [ ] YES  [ ] NO

## 2022-07-05 NOTE — PROGRESS NOTE ADULT - SUBJECTIVE AND OBJECTIVE BOX
pt seen in icu [  ], reg med floor [   ], bed [  ], chair at bedside [   ]  Awake, alert, comfortable in bed in NAD. No diarrhea. Awaiting MRCP.    REVIEW OF SYSTEMS:    CONSTITUTIONAL: No weakness, fevers or chills  EYES/ENT: No visual changes;  No vertigo or throat pain   NECK: No pain or stiffness  RESPIRATORY: No cough, wheezing, hemoptysis; No shortness of breath  CARDIOVASCULAR: No chest pain or palpitations  GASTROINTESTINAL: No abdominal or epigastric pain. No nausea, vomiting, or hematemesis; No diarrhea or constipation. No melena or hematochezia.  GENITOURINARY: No dysuria, frequency or hematuria  NEUROLOGICAL: No numbness or weakness  SKIN: No itching, burning, rashes, or lesions   All other review of systems is negative unless indicated above.    Physical Exam    General: WN/WD NAD  Neurology: A&Ox3, nonfocal, PARDO x 4  Respiratory: CTA B/L  CV: RRR, S1S2, no murmurs, rubs or gallops  Abdominal: Soft, NT, ND +BS, Last BM  Extremities: No edema, + peripheral pulses      Allergies  No Known Allergies      Health Issues  Calculus of bile duct without obstruction, cholangitis, or cholecystitis    Diabetes mellitus    Hypertension    S/P cholecystectomy        Vitals  T(F): 97.6 (07-05-22 @ 05:14), Max: 98.4 (07-04-22 @ 20:18)  HR: 58 (07-05-22 @ 05:14) (58 - 63)  BP: 125/63 (07-05-22 @ 05:14) (111/55 - 125/63)  RR: 18 (07-05-22 @ 05:14) (16 - 18)  SpO2: 100% (07-05-22 @ 05:14) (100% - 100%)  Wt(kg): --  CAPILLARY BLOOD GLUCOSE      POCT Blood Glucose.: 137 mg/dL (05 Jul 2022 07:24)      Labs                          14.2   5.44  )-----------( 200      ( 04 Jul 2022 06:42 )             41.5       07-04    142  |  108  |  7   ----------------------------<  115<H>  3.3<L>   |  22  |  0.31<L>    Ca    8.5      04 Jul 2022 06:42    TPro  6.2  /  Alb  2.8<L>  /  TBili  0.6  /  DBili  x   /  AST  34  /  ALT  96<H>  /  AlkPhos  75  07-04            Radiology Results      Meds    MEDICATIONS  (STANDING):  atorvastatin 20 milliGRAM(s) Oral at bedtime  dextrose 5%. 1000 milliLiter(s) (50 mL/Hr) IV Continuous <Continuous>  enoxaparin Injectable 40 milliGRAM(s) SubCutaneous every 24 hours  glucagon  Injectable 1 milliGRAM(s) IntraMuscular once  insulin lispro (ADMELOG) corrective regimen sliding scale   SubCutaneous three times a day before meals  insulin lispro (ADMELOG) corrective regimen sliding scale   SubCutaneous at bedtime  levoFLOXacin  Tablet 500 milliGRAM(s) Oral every 24 hours  pantoprazole    Tablet 40 milliGRAM(s) Oral before breakfast  sodium chloride 0.9%. 1000 milliLiter(s) (75 mL/Hr) IV Continuous <Continuous>      MEDICATIONS  (PRN):  ondansetron Injectable 4 milliGRAM(s) IV Push once PRN Nausea and/or Vomiting         pt seen in icu [  ], reg med floor [ x  ], bed [  x], chair at bedside [   ]  Awake, alert, comfortable in bed in NAD. No diarrhea. MRCP noted.    REVIEW OF SYSTEMS:    CONSTITUTIONAL: No weakness, fevers or chills  EYES/ENT: No visual changes;  No vertigo or throat pain   NECK: No pain or stiffness  RESPIRATORY: No cough, wheezing, hemoptysis; No shortness of breath  CARDIOVASCULAR: No chest pain or palpitations  GASTROINTESTINAL: No abdominal or epigastric pain. No nausea, vomiting, or hematemesis; No diarrhea or constipation. No melena or hematochezia.  GENITOURINARY: No dysuria, frequency or hematuria  NEUROLOGICAL: No numbness or weakness  SKIN: No itching, burning, rashes, or lesions   All other review of systems is negative unless indicated above.    Physical Exam    General: WN/WD NAD  Neurology: A&Ox3, nonfocal, PARDO x 4  Respiratory: CTA B/L  CV: RRR, S1S2, no murmurs, rubs or gallops  Abdominal: Soft, NT, ND +BS, Last BM  Extremities: No edema, + peripheral pulses      Allergies  No Known Allergies      Health Issues  Calculus of bile duct without obstruction, cholangitis, or cholecystitis    Diabetes mellitus    Hypertension    S/P cholecystectomy        Vitals  T(F): 97.6 (07-05-22 @ 05:14), Max: 98.4 (07-04-22 @ 20:18)  HR: 58 (07-05-22 @ 05:14) (58 - 63)  BP: 125/63 (07-05-22 @ 05:14) (111/55 - 125/63)  RR: 18 (07-05-22 @ 05:14) (16 - 18)  SpO2: 100% (07-05-22 @ 05:14) (100% - 100%)  Wt(kg): --  CAPILLARY BLOOD GLUCOSE      POCT Blood Glucose.: 137 mg/dL (05 Jul 2022 07:24)      Labs                          14.2   5.44  )-----------( 200      ( 04 Jul 2022 06:42 )             41.5       07-04    142  |  108  |  7   ----------------------------<  115<H>  3.3<L>   |  22  |  0.31<L>    Ca    8.5      04 Jul 2022 06:42    TPro  6.2  /  Alb  2.8<L>  /  TBili  0.6  /  DBili  x   /  AST  34  /  ALT  96<H>  /  AlkPhos  75  07-04        GI PCR Panel, Stool (07.03.22 @ 04:02)   Specimen Source: .Stool Feces   Culture Results:   Rotavirus A   DETECTED by PCR Culture - Stool (07.03.22 @ 04:02)   Specimen Source: .Stool Feces   Culture Results:   No enteric pathogens isolated.   (Stool culture examined for Salmonella,   Shigella, Campylobacter, Aeromonas, Plesiomonas,   Vibrio, E.coli O157 and Yersinia)     Radiology Results  < from: MR MRCP No Cont (07.04.22 @ 12:48) >  IMPRESSION: Mild CBD dilatation, favored to represent a   postcholecystectomy reservoir effect. Correlate with LFTs. No evidence of   choledocholithiasis.    < end of copied text >      Meds    MEDICATIONS  (STANDING):  atorvastatin 20 milliGRAM(s) Oral at bedtime  dextrose 5%. 1000 milliLiter(s) (50 mL/Hr) IV Continuous <Continuous>  enoxaparin Injectable 40 milliGRAM(s) SubCutaneous every 24 hours  glucagon  Injectable 1 milliGRAM(s) IntraMuscular once  insulin lispro (ADMELOG) corrective regimen sliding scale   SubCutaneous three times a day before meals  insulin lispro (ADMELOG) corrective regimen sliding scale   SubCutaneous at bedtime  levoFLOXacin  Tablet 500 milliGRAM(s) Oral every 24 hours  pantoprazole    Tablet 40 milliGRAM(s) Oral before breakfast  sodium chloride 0.9%. 1000 milliLiter(s) (75 mL/Hr) IV Continuous <Continuous>      MEDICATIONS  (PRN):  ondansetron Injectable 4 milliGRAM(s) IV Push once PRN Nausea and/or Vomiting

## 2022-07-05 NOTE — PROGRESS NOTE ADULT - PROBLEM SELECTOR PLAN 1
-diarrhea likley in setting of acute gastroenteritis   -stool PCR positive for Campylobacter, Plesiomonas, Shingelloides, & Salmonella  -cont Levaquin   -tolerating clears, no further diarrhea, advance diet today   -supportive measures   -ID Dr. Garcia

## 2022-07-05 NOTE — CONSULT NOTE ADULT - SUBJECTIVE AND OBJECTIVE BOX
HPI:  52 year old female with a past medical history of DM and HLD and surgical history of cholecystectomy coming to ED complaining of diarrhea since yesterday morning. Patient states that she has had 10 episodes of non bloody diarrhea episodes associated with 1 episode of non bloody non bilious vomiting. She also endorses generalized abdominal discomfort relieved by bowel movements. She denies any fevers, chills, headaches dizziness, chest pain, SOB, abd pain, hematuria, dysuria, numbness, and weakness.    In ED VS: 97.2, HR 97, /88, RR 16, Spo2 98%  Na 133, ,    CT ABD: Mildly dilated common bile duct with question of distal stone  Received 2L NS bolus in ED                   PAST MEDICAL & SURGICAL HISTORY:  Diabetes mellitus      Hypertension      S/P cholecystectomy          No Known Allergies      Meds:  atorvastatin 20 milliGRAM(s) Oral at bedtime  dextrose 5%. 1000 milliLiter(s) IV Continuous <Continuous>  enoxaparin Injectable 40 milliGRAM(s) SubCutaneous every 24 hours  glucagon  Injectable 1 milliGRAM(s) IntraMuscular once  insulin lispro (ADMELOG) corrective regimen sliding scale   SubCutaneous three times a day before meals  insulin lispro (ADMELOG) corrective regimen sliding scale   SubCutaneous at bedtime  levoFLOXacin  Tablet 500 milliGRAM(s) Oral every 24 hours  ondansetron Injectable 4 milliGRAM(s) IV Push once PRN  pantoprazole    Tablet 40 milliGRAM(s) Oral before breakfast  sodium chloride 0.9%. 1000 milliLiter(s) IV Continuous <Continuous>      SOCIAL HISTORY:  Smoker:  YES / NO        PACK YEARS:                         WHEN QUIT?  ETOH use:  YES / NO               FREQUENCY / QUANTITY:  Ilicit Drug use:  YES / NO  Occupation:  Assisted device use (Cane / Walker):  Live with:    FAMILY HISTORY:      VITALS:  Vital Signs Last 24 Hrs  T(C): 36.6 (05 Jul 2022 12:52), Max: 36.9 (04 Jul 2022 20:18)  T(F): 97.8 (05 Jul 2022 12:52), Max: 98.4 (04 Jul 2022 20:18)  HR: 76 (05 Jul 2022 12:52) (58 - 76)  BP: 129/59 (05 Jul 2022 12:52) (117/59 - 129/59)  BP(mean): 77 (05 Jul 2022 05:14) (72 - 77)  RR: 18 (05 Jul 2022 12:52) (16 - 18)  SpO2: 99% (05 Jul 2022 12:52) (99% - 100%)    LABS/DIAGNOSTIC TESTS:                          14.9   5.03  )-----------( 205      ( 05 Jul 2022 08:06 )             43.5     WBC Count: 5.03 K/uL (07-05 @ 08:06)  WBC Count: 5.44 K/uL (07-04 @ 06:42)  WBC Count: 4.79 K/uL (07-03 @ 07:35)      07-05    141  |  108  |  5<L>  ----------------------------<  155<H>  3.7   |  22  |  0.34<L>    Ca    8.9      05 Jul 2022 08:06    TPro  6.2  /  Alb  2.8<L>  /  TBili  0.6  /  DBili  x   /  AST  34  /  ALT  96<H>  /  AlkPhos  75  07-04          LIVER FUNCTIONS - ( 04 Jul 2022 06:42 )  Alb: 2.8 g/dL / Pro: 6.2 g/dL / ALK PHOS: 75 U/L / ALT: 96 U/L DA / AST: 34 U/L / GGT: x                 LACTATE:    ABG -     CULTURES:   .Stool Other, stool  07-03 @ 04:06   Testing in progress  --  --      .Stool Feces  07-03 @ 04:02   No enteric pathogens isolated.  (Stool culture examined for Salmonella,  Shigella, Campylobacter, Aeromonas, Plesiomonas,  Vibrio, E.coli O157 and Yersinia)  --  --          RADIOLOGY:< from: CT Abdomen and Pelvis w/ IV Cont (07.01.22 @ 00:59) >  ACC: [id number] EXAM:  CT ABDOMEN AND PELVIS IC                          PROCEDURE DATE:  07/01/2022          INTERPRETATION:  CLINICAL INFORMATION: Nausea and vomiting.    COMPARISON: None.    CONTRAST/COMPLICATIONS:  IV Contrast: Omnipaque 350  90 cc administered   10 cc discarded  Oral Contrast: NONE  Complications: None reported at time of study completion    PROCEDURE:  CT of the Abdomen and Pelvis was performed.  Sagittal and coronal reformats were performed.    FINDINGS:  LOWER CHEST: Within normal limits.    LIVER: Diffuse low attenuation suggesting steatosis.  BILE DUCTS: Dilatation of the common bile duct to 10 mm with questionable   stone in the distal duct measuring 7 mm (series 4 image 62). No   significant intrahepatic biliary duct dilatation  GALLBLADDER: Cholecystectomy.  SPLEEN: Within normal limits.  PANCREAS: Within normal limits.  ADRENALS: Within normal limits.  KIDNEYS/URETERS: Within normal limits.    BLADDER: Underdistended.  REPRODUCTIVE ORGANS: The uterus and adnexal structures are within normal   limits.    BOWEL: No bowel obstruction. Appendix is normal. No significant colonic   wall thickening or pericolonic inflammatory change.  PERITONEUM: No ascites.  VESSELS: Within normal limits.  RETROPERITONEUM/LYMPH NODES: No lymphadenopathy.  ABDOMINAL WALL: Within normal limits.  BONES: Within normal limits.    IMPRESSION:  No bowel obstruction, wall thickening or inflammatory change.    Mildly dilated common bile duct with question of distal stone. Consider   follow-up with MRCP for further evaluation.    --- End of Report ---            LISA SPENCE MD; Attending Radiologist  This document has been electronically signed. Jul 1 2022  3:18AM    < end of copied text >  -------------------------------------------------------------------------------------------------------------------------------------------------------------------------------------------------------------------------------------------  ACC: [id number] EXAM:  MR MRCP                          PROCEDURE DATE:  07/04/2022          INTERPRETATION:  CLINICAL INFORMATION: Diarrhea. Cholecystectomy.    COMPARISON: Contrast-enhanced CT of the abdomen and pelvis July 1, 2022.    CONTRAST/COMPLICATIONS:  IV Contrast: NONE  Oral Contrast: NONE  Complications: None reported at time of study completion    PROCEDURE:  MRI/MRCP was performed. Radial and 3D MRCP sequences were obtained.      FINDINGS:  LOWER CHEST: Within normal limits.    LIVER: Within normal limits.  BILE DUCTS: There is no intrahepatic biliary dilatation. Again is noted   dilatation of the common bile duct to 10 mm with abrupt tapering at the   level of the ampulla. There is no evidence of filling defect in the   common bile duct to suggest choledocholithiasis.  GALLBLADDER: Removed.  SPLEEN: Within normal limits.  PANCREAS: The pancreatic duct is normal in course and caliber.  ADRENALS: Within normal limits.  KIDNEYS/URETERS: Within normal limits.    VISUALIZED PORTIONS:  BOWEL: Within normal limits.  PERITONEUM: No ascites.  VESSELS: Within normal limits.  RETROPERITONEUM/LYMPH NODES: No lymphadenopathy.  ABDOMINAL WALL: Within normal limits.  BONES: Within normal limits.    IMPRESSION: Mild CBD dilatation, favored to represent a   postcholecystectomy reservoir effect. Correlate with LFTs. No evidence of   choledocholithiasis.    --- End of Report ---            SARAH MICHAEL MD; Attending Radiologist  This document has been electronically signed. Jul 5 2022 9:06AM    < end of copied text >        ROS  [  ] UNABLE TO ELICIT               HPI:  52 year old female with a past medical history of DM and HLD and surgical history of cholecystectomy coming to ED complaining of diarrhea since yesterday morning. Patient states that she has had 10 episodes of non bloody diarrhea episodes associated with 1 episode of non bloody non bilious vomiting. She also endorses generalized abdominal discomfort relieved by bowel movements. She denies any fevers, chills, headaches dizziness, chest pain, SOB, abd pain, hematuria, dysuria, numbness, and weakness.    In ED VS: 97.2, HR 97, /88, RR 16, Spo2 98%  Na 133, ,    CT ABD: Mildly dilated common bile duct with question of distal stone  Received 2L NS bolus in ED           History as above, asked to see this patient who presented with 2-3 days of diarrhea from home, she had no nausea, vomiting , fevers or chills, but had abdominal pain, she has not travelled anywhere in the recent past, no sick contacts, she denies eating anything undercooked or raw, no one in the household is ill. She is doing much better now and her diarrhea stopped on Sunday, I had switched over to Levaquin as her PCR was positive for multiple pathogens but I'm not sure which is the offending organism as it tends not to be very accurate. She had a MRCP and CT of her abdomen that did not show any colitis or choledocholithiasis.        PAST MEDICAL & SURGICAL HISTORY:  Diabetes mellitus      Hypertension      S/P cholecystectomy          No Known Allergies      Meds:  atorvastatin 20 milliGRAM(s) Oral at bedtime  dextrose 5%. 1000 milliLiter(s) IV Continuous <Continuous>  enoxaparin Injectable 40 milliGRAM(s) SubCutaneous every 24 hours  glucagon  Injectable 1 milliGRAM(s) IntraMuscular once  insulin lispro (ADMELOG) corrective regimen sliding scale   SubCutaneous three times a day before meals  insulin lispro (ADMELOG) corrective regimen sliding scale   SubCutaneous at bedtime  levoFLOXacin  Tablet 500 milliGRAM(s) Oral every 24 hours  ondansetron Injectable 4 milliGRAM(s) IV Push once PRN  pantoprazole    Tablet 40 milliGRAM(s) Oral before breakfast  sodium chloride 0.9%. 1000 milliLiter(s) IV Continuous <Continuous>      SOCIAL HISTORY:  Smoker:  no  ETOH use:  no  Ilicit Drug use:  no      FAMILY HISTORY: not contributory      VITALS:  Vital Signs Last 24 Hrs  T(C): 36.6 (05 Jul 2022 12:52), Max: 36.9 (04 Jul 2022 20:18)  T(F): 97.8 (05 Jul 2022 12:52), Max: 98.4 (04 Jul 2022 20:18)  HR: 76 (05 Jul 2022 12:52) (58 - 76)  BP: 129/59 (05 Jul 2022 12:52) (117/59 - 129/59)  BP(mean): 77 (05 Jul 2022 05:14) (72 - 77)  RR: 18 (05 Jul 2022 12:52) (16 - 18)  SpO2: 99% (05 Jul 2022 12:52) (99% - 100%)    LABS/DIAGNOSTIC TESTS:                          14.9   5.03  )-----------( 205      ( 05 Jul 2022 08:06 )             43.5     WBC Count: 5.03 K/uL (07-05 @ 08:06)  WBC Count: 5.44 K/uL (07-04 @ 06:42)  WBC Count: 4.79 K/uL (07-03 @ 07:35)      07-05    141  |  108  |  5<L>  ----------------------------<  155<H>  3.7   |  22  |  0.34<L>    Ca    8.9      05 Jul 2022 08:06    TPro  6.2  /  Alb  2.8<L>  /  TBili  0.6  /  DBili  x   /  AST  34  /  ALT  96<H>  /  AlkPhos  75  07-04          LIVER FUNCTIONS - ( 04 Jul 2022 06:42 )  Alb: 2.8 g/dL / Pro: 6.2 g/dL / ALK PHOS: 75 U/L / ALT: 96 U/L DA / AST: 34 U/L / GGT: x                 LACTATE:    ABG -     CULTURES:   .Stool Other, stool  07-03 @ 04:06   Testing in progress  --  --      .Stool Feces  07-03 @ 04:02   No enteric pathogens isolated.  (Stool culture examined for Salmonella,  Shigella, Campylobacter, Aeromonas, Plesiomonas,  Vibrio, E.coli O157 and Yersinia)  --  --          RADIOLOGY:< from: CT Abdomen and Pelvis w/ IV Cont (07.01.22 @ 00:59) >  ACC: [id number] EXAM:  CT ABDOMEN AND PELVIS IC                          PROCEDURE DATE:  07/01/2022          INTERPRETATION:  CLINICAL INFORMATION: Nausea and vomiting.    COMPARISON: None.    CONTRAST/COMPLICATIONS:  IV Contrast: Omnipaque 350  90 cc administered   10 cc discarded  Oral Contrast: NONE  Complications: None reported at time of study completion    PROCEDURE:  CT of the Abdomen and Pelvis was performed.  Sagittal and coronal reformats were performed.    FINDINGS:  LOWER CHEST: Within normal limits.    LIVER: Diffuse low attenuation suggesting steatosis.  BILE DUCTS: Dilatation of the common bile duct to 10 mm with questionable   stone in the distal duct measuring 7 mm (series 4 image 62). No   significant intrahepatic biliary duct dilatation  GALLBLADDER: Cholecystectomy.  SPLEEN: Within normal limits.  PANCREAS: Within normal limits.  ADRENALS: Within normal limits.  KIDNEYS/URETERS: Within normal limits.    BLADDER: Underdistended.  REPRODUCTIVE ORGANS: The uterus and adnexal structures are within normal   limits.    BOWEL: No bowel obstruction. Appendix is normal. No significant colonic   wall thickening or pericolonic inflammatory change.  PERITONEUM: No ascites.  VESSELS: Within normal limits.  RETROPERITONEUM/LYMPH NODES: No lymphadenopathy.  ABDOMINAL WALL: Within normal limits.  BONES: Within normal limits.    IMPRESSION:  No bowel obstruction, wall thickening or inflammatory change.    Mildly dilated common bile duct with question of distal stone. Consider   follow-up with MRCP for further evaluation.    --- End of Report ---            LISA SPENCE MD; Attending Radiologist  This document has been electronically signed. Jul  1 2022  3:18AM    < end of copied text >  -------------------------------------------------------------------------------------------------------------------------------------------------------------------------------------------------------------------------------------------  ACC: [id number] EXAM:  MR MRCP                          PROCEDURE DATE:  07/04/2022          INTERPRETATION:  CLINICAL INFORMATION: Diarrhea. Cholecystectomy.    COMPARISON: Contrast-enhanced CT of the abdomen and pelvis July 1, 2022.    CONTRAST/COMPLICATIONS:  IV Contrast: NONE  Oral Contrast: NONE  Complications: None reported at time of study completion    PROCEDURE:  MRI/MRCP was performed. Radial and 3D MRCP sequences were obtained.      FINDINGS:  LOWER CHEST: Within normal limits.    LIVER: Within normal limits.  BILE DUCTS: There is no intrahepatic biliary dilatation. Again is noted   dilatation of the common bile duct to 10 mm with abrupt tapering at the   level of the ampulla. There is no evidence of filling defect in the   common bile duct to suggest choledocholithiasis.  GALLBLADDER: Removed.  SPLEEN: Within normal limits.  PANCREAS: The pancreatic duct is normal in course and caliber.  ADRENALS: Within normal limits.  KIDNEYS/URETERS: Within normal limits.    VISUALIZED PORTIONS:  BOWEL: Within normal limits.  PERITONEUM: No ascites.  VESSELS: Within normal limits.  RETROPERITONEUM/LYMPH NODES: No lymphadenopathy.  ABDOMINAL WALL: Within normal limits.  BONES: Within normal limits.    IMPRESSION: Mild CBD dilatation, favored to represent a   postcholecystectomy reservoir effect. Correlate with LFTs. No evidence of   choledocholithiasis.    --- End of Report ---            SARAH MICHAEL MD; Attending Radiologist  This document has been electronically signed. Jul 5 2022 9:06AM    < end of copied text >        ROS  [  ] UNABLE TO ELICIT

## 2022-07-05 NOTE — DIETITIAN INITIAL EVALUATION ADULT - OTHER INFO
Patient from home lives with family admitted for 1 day of diarrhea and vomiting, s/p stool PCR is positive for Campylobacter, Plesiomonas, Shigelloides & Salmonella on 07/04/22 & received Abx. tx. Visited pt. alert & awake with  at bedside, pt. able to communicate in English/E[place]ian, reports of usual po intake was good, avoids rice, pasta & breads however recently "attended family party to celebrate special occasion" & increase intake of "meats, rice & sweets (cake/ice cream), also for the past 2 weeks "run out of Metformin", unable to refill with PCP & "getting sick"? Pt. accepted nutrition education on "My Plate Planner w/carbohydrate counting" in [place]ian, reviewed food lists with menu sample, pt. receptive, encourage to follow up with PCP & blood glucose monitoring once d/micheline to home. Presently pt. diet advance to consistent carbohydrate diet, tolerating meals, no reports of "loose stools", intake >75% noted, seen by ID/GI team, d/w RN.

## 2022-07-05 NOTE — PROGRESS NOTE ADULT - PROBLEM SELECTOR PLAN 2
-MRI showed mild CBD dilatation, favored to represent a postcholecystectomy reservoir effect. No evidence of choledocholithiasis  -advance diet today   -GI Dr. Chase

## 2022-07-05 NOTE — PROGRESS NOTE ADULT - SUBJECTIVE AND OBJECTIVE BOX
[   ] ICU                                          [   ] CCU                                      [  X ] Medical Floor    Patient is a 52 year old female with diarrhea and dilated CBD. GI consulted to evaluate.        52 year old female with a past medical history significant for DM and HLD and surgical history of cholecystectomy presented to the emergency room with 2 days history of watery, non bloody diarrhea associated with intermittent crampy non radiating, 5-6/10 intensity periumbilical abdominal painone episode of non bloody vomiting. Patient denies hematemesis, hematochezia, melena, fever, chills, chest pain, SOB, cough, hematuria, dysuria or recent traveling or antibiotic use.      Patient appears comfortable. No new complaints reported, No abdominal pain, N/V, hematemesis, hematochezia, melena, fever, chills, chest pain, SOB, cough or diarrhea reported.      PAIN MANAGEMENT:  Pain Scale:                 2/10  Pain Location:  Periumbilical abdominal pain      Prior Colonoscopy: Unknown      PAST MEDICAL HISTORY  Diabetes mellitus  Hypertension      PAST SURGICAL HISTORY  Cholecystectomy        Allergies    No Known Allergies    Intolerances  None         MEDICATIONS  (STANDING):  atorvastatin 20 milliGRAM(s) Oral at bedtime  dextrose 5%. 1000 milliLiter(s) (50 mL/Hr) IV Continuous <Continuous>  enoxaparin Injectable 40 milliGRAM(s) SubCutaneous every 24 hours  glucagon  Injectable 1 milliGRAM(s) IntraMuscular once  insulin lispro (ADMELOG) corrective regimen sliding scale   SubCutaneous at bedtime  insulin lispro (ADMELOG) corrective regimen sliding scale.   SubCutaneous every 8 hours  pantoprazole    Tablet 40 milliGRAM(s) Oral before breakfast  potassium chloride  10 mEq/100 mL IVPB 10 milliEquivalent(s) IV Intermittent every 1 hour  potassium phosphate IVPB 30 milliMole(s) IV Intermittent once  sodium chloride 0.9%. 1000 milliLiter(s) (75 mL/Hr) IV Continuous <Continuous>    MEDICATIONS  (PRN):  ondansetron Injectable 4 milliGRAM(s) IV Push once PRN Nausea and/or Vomiting      SOCIAL HISTORY  Advanced Directives:       [ X ] Full Code       [  ] DNR  Marital Status:         [  ] M      [ X ] S      [  ] D       [  ] W  Children:       [ X ] Yes      [  ] No  Occupation:        [  ] Employed       [X  ] Unemployed       [  ] Retired  Diet:       [ X ] Regular       [  ] PEG feeding          [  ] NG tube feeding  Drug Use:           [ X ] Patient denied          [  ] Yes  Alcohol:           [ X ] No             [  ] Yes (socially)         [  ] Yes (chronic)  Tobacco:           [  ] Yes           [ X ] No      FAMILY HISTORY  [ X ] Heart Disease            [ X ] Diabetes             [ X ] HTN             [  ] Colon Cancer             [  ] Stomach Cancer              [  ] Pancreatic Cancer      VITALS  Vital Signs Last 24 Hrs  T(C): 36.6 (05 Jul 2022 12:52), Max: 36.9 (04 Jul 2022 20:18)  T(F): 97.8 (05 Jul 2022 12:52), Max: 98.4 (04 Jul 2022 20:18)  HR: 76 (05 Jul 2022 12:52) (58 - 76)  BP: 129/59 (05 Jul 2022 12:52) (117/59 - 129/59)  BP(mean): 77 (05 Jul 2022 05:14) (72 - 77)  RR: 18 (05 Jul 2022 12:52) (16 - 18)  SpO2: 99% (05 Jul 2022 12:52) (99% - 100%)       MEDICATIONS  (STANDING):  atorvastatin 20 milliGRAM(s) Oral at bedtime  dextrose 5%. 1000 milliLiter(s) (50 mL/Hr) IV Continuous <Continuous>  enoxaparin Injectable 40 milliGRAM(s) SubCutaneous every 24 hours  glucagon  Injectable 1 milliGRAM(s) IntraMuscular once  insulin lispro (ADMELOG) corrective regimen sliding scale   SubCutaneous three times a day before meals  insulin lispro (ADMELOG) corrective regimen sliding scale   SubCutaneous at bedtime  levoFLOXacin  Tablet 500 milliGRAM(s) Oral every 24 hours  pantoprazole    Tablet 40 milliGRAM(s) Oral before breakfast  sodium chloride 0.9%. 1000 milliLiter(s) (75 mL/Hr) IV Continuous <Continuous>    MEDICATIONS  (PRN):  ondansetron Injectable 4 milliGRAM(s) IV Push once PRN Nausea and/or Vomiting                            14.9   5.03  )-----------( 205      ( 05 Jul 2022 08:06 )             43.5       07-05    141  |  108  |  5<L>  ----------------------------<  155<H>  3.7   |  22  |  0.34<L>    Ca    8.9      05 Jul 2022 08:06    TPro  6.2  /  Alb  2.8<L>  /  TBili  0.6  /  DBili  x   /  AST  34  /  ALT  96<H>  /  AlkPhos  75  07-04      ACC: [id number] EXAM:  MR MRCP                          PROCEDURE DATE:  07/04/2022          INTERPRETATION:  CLINICAL INFORMATION: Diarrhea. Cholecystectomy.    COMPARISON: Contrast-enhanced CT of the abdomen and pelvis July 1, 2022.    CONTRAST/COMPLICATIONS:  IV Contrast: NONE  Oral Contrast: NONE  Complications: None reported at time of study completion    PROCEDURE:  MRI/MRCP was performed. Radial and 3D MRCP sequences were obtained.      FINDINGS:  LOWER CHEST: Within normal limits.    LIVER: Within normal limits.  BILE DUCTS: There is no intrahepatic biliary dilatation. Again is noted   dilatation of the common bile duct to 10 mm with abrupt tapering at the   level of the ampulla. There is no evidence of filling defect in the   common bile duct to suggest choledocholithiasis.  GALLBLADDER: Removed.  SPLEEN: Within normal limits.  PANCREAS: The pancreatic duct is normal in course and caliber.  ADRENALS: Within normal limits.  KIDNEYS/URETERS: Within normal limits.    VISUALIZED PORTIONS:  BOWEL: Within normal limits.  PERITONEUM: No ascites.  VESSELS: Within normal limits.  RETROPERITONEUM/LYMPH NODES: No lymphadenopathy.  ABDOMINAL WALL: Within normal limits.  BONES: Within normal limits.    IMPRESSION: Mild CBD dilatation, favored to represent a   postcholecystectomy reservoir effect. Correlate with LFTs. No evidence of   choledocholithiasis.

## 2022-07-06 ENCOUNTER — TRANSCRIPTION ENCOUNTER (OUTPATIENT)
Age: 52
End: 2022-07-06

## 2022-07-06 VITALS
HEART RATE: 71 BPM | RESPIRATION RATE: 18 BRPM | SYSTOLIC BLOOD PRESSURE: 110 MMHG | TEMPERATURE: 98 F | OXYGEN SATURATION: 100 % | DIASTOLIC BLOOD PRESSURE: 71 MMHG

## 2022-07-06 LAB
ALBUMIN SERPL ELPH-MCNC: 3.1 G/DL — LOW (ref 3.5–5)
ALP SERPL-CCNC: 72 U/L — SIGNIFICANT CHANGE UP (ref 40–120)
ALT FLD-CCNC: 84 U/L DA — HIGH (ref 10–60)
ANION GAP SERPL CALC-SCNC: 7 MMOL/L — SIGNIFICANT CHANGE UP (ref 5–17)
AST SERPL-CCNC: 37 U/L — SIGNIFICANT CHANGE UP (ref 10–40)
BILIRUB SERPL-MCNC: 0.4 MG/DL — SIGNIFICANT CHANGE UP (ref 0.2–1.2)
BUN SERPL-MCNC: 13 MG/DL — SIGNIFICANT CHANGE UP (ref 7–18)
CALCIUM SERPL-MCNC: 9 MG/DL — SIGNIFICANT CHANGE UP (ref 8.4–10.5)
CHLORIDE SERPL-SCNC: 104 MMOL/L — SIGNIFICANT CHANGE UP (ref 96–108)
CO2 SERPL-SCNC: 28 MMOL/L — SIGNIFICANT CHANGE UP (ref 22–31)
CREAT SERPL-MCNC: 0.47 MG/DL — LOW (ref 0.5–1.3)
CULTURE RESULTS: SIGNIFICANT CHANGE UP
EGFR: 113 ML/MIN/1.73M2 — SIGNIFICANT CHANGE UP
GLUCOSE BLDC GLUCOMTR-MCNC: 167 MG/DL — HIGH (ref 70–99)
GLUCOSE BLDC GLUCOMTR-MCNC: 170 MG/DL — HIGH (ref 70–99)
GLUCOSE SERPL-MCNC: 179 MG/DL — HIGH (ref 70–99)
HCT VFR BLD CALC: 42.8 % — SIGNIFICANT CHANGE UP (ref 34.5–45)
HGB BLD-MCNC: 14.5 G/DL — SIGNIFICANT CHANGE UP (ref 11.5–15.5)
MCHC RBC-ENTMCNC: 30.2 PG — SIGNIFICANT CHANGE UP (ref 27–34)
MCHC RBC-ENTMCNC: 33.9 GM/DL — SIGNIFICANT CHANGE UP (ref 32–36)
MCV RBC AUTO: 89.2 FL — SIGNIFICANT CHANGE UP (ref 80–100)
NRBC # BLD: 0 /100 WBCS — SIGNIFICANT CHANGE UP (ref 0–0)
PLATELET # BLD AUTO: 203 K/UL — SIGNIFICANT CHANGE UP (ref 150–400)
POTASSIUM SERPL-MCNC: 4.2 MMOL/L — SIGNIFICANT CHANGE UP (ref 3.5–5.3)
POTASSIUM SERPL-SCNC: 4.2 MMOL/L — SIGNIFICANT CHANGE UP (ref 3.5–5.3)
PROT SERPL-MCNC: 6.7 G/DL — SIGNIFICANT CHANGE UP (ref 6–8.3)
RBC # BLD: 4.8 M/UL — SIGNIFICANT CHANGE UP (ref 3.8–5.2)
RBC # FLD: 12.8 % — SIGNIFICANT CHANGE UP (ref 10.3–14.5)
SODIUM SERPL-SCNC: 139 MMOL/L — SIGNIFICANT CHANGE UP (ref 135–145)
SPECIMEN SOURCE: SIGNIFICANT CHANGE UP
WBC # BLD: 5.97 K/UL — SIGNIFICANT CHANGE UP (ref 3.8–10.5)
WBC # FLD AUTO: 5.97 K/UL — SIGNIFICANT CHANGE UP (ref 3.8–10.5)

## 2022-07-06 PROCEDURE — 99285 EMERGENCY DEPT VISIT HI MDM: CPT | Mod: 25

## 2022-07-06 PROCEDURE — 82248 BILIRUBIN DIRECT: CPT

## 2022-07-06 PROCEDURE — 96361 HYDRATE IV INFUSION ADD-ON: CPT

## 2022-07-06 PROCEDURE — 74181 MRI ABDOMEN W/O CONTRAST: CPT

## 2022-07-06 PROCEDURE — 74177 CT ABD & PELVIS W/CONTRAST: CPT | Mod: MG

## 2022-07-06 PROCEDURE — 96360 HYDRATION IV INFUSION INIT: CPT

## 2022-07-06 PROCEDURE — 81001 URINALYSIS AUTO W/SCOPE: CPT

## 2022-07-06 PROCEDURE — 87045 FECES CULTURE AEROBIC BACT: CPT

## 2022-07-06 PROCEDURE — 83735 ASSAY OF MAGNESIUM: CPT

## 2022-07-06 PROCEDURE — 83690 ASSAY OF LIPASE: CPT

## 2022-07-06 PROCEDURE — 87046 STOOL CULTR AEROBIC BACT EA: CPT

## 2022-07-06 PROCEDURE — 87177 OVA AND PARASITES SMEARS: CPT

## 2022-07-06 PROCEDURE — 87507 IADNA-DNA/RNA PROBE TQ 12-25: CPT

## 2022-07-06 PROCEDURE — 85027 COMPLETE CBC AUTOMATED: CPT

## 2022-07-06 PROCEDURE — 87635 SARS-COV-2 COVID-19 AMP PRB: CPT

## 2022-07-06 PROCEDURE — 84443 ASSAY THYROID STIM HORMONE: CPT

## 2022-07-06 PROCEDURE — 36415 COLL VENOUS BLD VENIPUNCTURE: CPT

## 2022-07-06 PROCEDURE — 84100 ASSAY OF PHOSPHORUS: CPT

## 2022-07-06 PROCEDURE — 80048 BASIC METABOLIC PNL TOTAL CA: CPT

## 2022-07-06 PROCEDURE — 83036 HEMOGLOBIN GLYCOSYLATED A1C: CPT

## 2022-07-06 PROCEDURE — 82962 GLUCOSE BLOOD TEST: CPT

## 2022-07-06 PROCEDURE — 93005 ELECTROCARDIOGRAM TRACING: CPT

## 2022-07-06 PROCEDURE — 80053 COMPREHEN METABOLIC PANEL: CPT

## 2022-07-06 PROCEDURE — 81025 URINE PREGNANCY TEST: CPT

## 2022-07-06 PROCEDURE — 80061 LIPID PANEL: CPT

## 2022-07-06 PROCEDURE — G1004: CPT

## 2022-07-06 PROCEDURE — 85025 COMPLETE CBC W/AUTO DIFF WBC: CPT

## 2022-07-06 RX ADMIN — Medication 1: at 11:42

## 2022-07-06 RX ADMIN — ENOXAPARIN SODIUM 40 MILLIGRAM(S): 100 INJECTION SUBCUTANEOUS at 05:26

## 2022-07-06 RX ADMIN — PANTOPRAZOLE SODIUM 40 MILLIGRAM(S): 20 TABLET, DELAYED RELEASE ORAL at 05:26

## 2022-07-06 RX ADMIN — Medication 1: at 07:44

## 2022-07-06 NOTE — PROGRESS NOTE ADULT - NEGATIVE NEUROLOGICAL SYMPTOMS
no syncope/no tremors/no vertigo/no loss of sensation/no headache

## 2022-07-06 NOTE — DISCHARGE NOTE NURSING/CASE MANAGEMENT/SOCIAL WORK - PATIENT PORTAL LINK FT
You can access the FollowMyHealth Patient Portal offered by [facility] by registering at the following website: [email or web address]. By joining [employer]’s FollowMyHealth portal, you will also be able to view your health information using other applications (apps) compatible with our system.

## 2022-07-06 NOTE — PROGRESS NOTE ADULT - RESPIRATORY
clear to auscultation bilaterally/no wheezes/no rales/no rhonchi/no respiratory distress/no use of accessory muscles/airway patent

## 2022-07-06 NOTE — PROGRESS NOTE ADULT - ASSESSMENT
52 year old female with a past medical history of DM and HLD and surgical history of cholecystectomy coming to ED complaining of diarrhea. Admitted for further work up. CT Abd with mildly dilated common bile duct with question of distal stone, hx of cholecystectomy 2 years ago, started on NPO, IVF and pain regimen. GI Dr. Chase followed, stool PCR   positive for Campylobacter, Plesiomonas, Shingelloides, & Salmonella, started on Levaquin and ID consulted.  MRI showed mild CBD dilatation, favored to represent a postcholecystectomy reservoir effect. No evidence of choledocholithiasis. Diet advanced today         
1. Diarrhea  2. Gastroenteritis  3. Dilated CBD with ? CBD stone  4. Fatty liver    Suggestions:    1. Follow up stool study  2. Monitor electrolytes  3. MRCP  4. Monitor LFT'S  5. Protonix daily  6. Avoid NSAID  7. DVT prophylaxis
1. Diarrhea  2. Gastroenteritis  3. Dilated CBD with ? CBD stone  4. Fatty liver    Suggestions:    1. Follow up stool study  2. Monitor electrolytes  3. MRCP  4. Monitor LFT'S  5. Protonix daily  6. Avoid NSAID  7. DVT prophylaxis
1. Diarrhea  2. Gastroenteritis  3. Dilated CBD   4. No CBD stone  5. Fatty liver    Suggestions:    1. Follow up stool study  2. Monitor electrolytes  3. Diet as tolerated  4. Monitor LFT'S  5. Protonix daily  6. Avoid NSAID  7. DVT prophylaxis
1. Diarrhea  2. Gastroenteritis  3. Dilated CBD   4. No CBD stone  5. Fatty liver    Suggestions:    1. Follow up stool study  2. Monitor electrolytes  3. Diet as tolerated  4. Monitor LFT'S  5. Protonix daily  6. Avoid NSAID  7. DVT prophylaxis

## 2022-07-06 NOTE — DISCHARGE NOTE PROVIDER - PROVIDER TOKENS
PROVIDER:[TOKEN:[[id number]],FOLLOWUP:[1 week]] PROVIDER:[TOKEN:[[id number]],FOLLOWUP:[1 week]],PROVIDER:[TOKEN:[[id number]],FOLLOWUP:[1 week]]

## 2022-07-06 NOTE — PROGRESS NOTE ADULT - PROBLEM SELECTOR PLAN 5
Accuchecks with reg insulin coverage  HBA1C.
-on Metformin at home   -cont [facility]   -diabetic diet

## 2022-07-06 NOTE — DISCHARGE NOTE PROVIDER - NSDCCPCAREPLAN_GEN_ALL_CORE_FT
PRINCIPAL DISCHARGE DIAGNOSIS  Diagnosis: Acute gastroenteritis  Assessment and Plan of Treatment:       SECONDARY DISCHARGE DIAGNOSES  Diagnosis: Diarrhea  Assessment and Plan of Treatment:     Diagnosis: DM (diabetes mellitus)  Assessment and Plan of Treatment:     Diagnosis: Transaminitis  Assessment and Plan of Treatment:     Diagnosis: HLD (hyperlipidemia)  Assessment and Plan of Treatment:      PRINCIPAL DISCHARGE DIAGNOSIS  Diagnosis: Acute gastroenteritis  Assessment and Plan of Treatment: You presented to hospital with diarrhea and were admitted for gastroenteritis. Gastroenteritis, or stomach flu, is an infection of the stomach and intestines. Your symptoms improved with fluids, bowel rest, and antibiotics.  Please continue to take the medications as prescribed and follow up with a gastroenterologist and PCP outpatient.  Seek care immediately if:  -You see blood in your diarrhea.  -You cannot stop vomiting.  -You have not urinated for 12 hours.  -You feel like you are going to faint.      SECONDARY DISCHARGE DIAGNOSES  Diagnosis: Diarrhea  Assessment and Plan of Treatment: you presented to the hospital with diarrhea and this has improved. e tested your stool and found that it was positive for some bacteria. The infectious disease doctor followed you while you were in the hospital and recommended anitbiotics. Please continue to take the medications as prescribed and follow up with the gastroenterologist outpatient.    Diagnosis: DM (diabetes mellitus)  Assessment and Plan of Treatment: Please continue to take you home medications as prescribed and follow up with your PCP.    Diagnosis: Transaminitis  Assessment and Plan of Treatment: You have elevated liver function while you were in the hospital and this has resolved. This is likely due to gastroenteritis and you should follow up with your PCP.    Diagnosis: Dilated cbd, acquired  Assessment and Plan of Treatment: You were found to have a dilated Common bile uct on imaging, you were followed by the gastroenterologist and this likely due to the colecystectomy that you had done 2 years ago. The GI doctor does not recommend any further interventions you should follo wup outpatient.    Diagnosis: HLD (hyperlipidemia)  Assessment and Plan of Treatment: Please continue to take your home medications and follow up with your PCP for continued management of your HLD.

## 2022-07-06 NOTE — DISCHARGE NOTE PROVIDER - HOSPITAL COURSE
52 year old female with a past medical history of DM and HLD that presents to the hospital with complaints of diarrhea. Admitted for acute gastroenteritis. CT Abd showed mildly dilated common bile duct with question of distal stone, hx of cholecystectomy 2 years ago, started on NPO, IVF and pain regimen. GI Dr. Chase followed. Stool PCR positive for Campylobacter, Plesiomonas, Shingelloides, & Salmonella, started on Levaquin and ID consulted.  MRI abd showed mild CBD dilatation, favored to represent a postcholecystectomy reservoir effect. No evidence of choledocholithiasis. Diet advanced today 52 year old female with a past medical history of DM and HLD that presents to the hospital with complaints of diarrhea. Admitted for acute gastroenteritis. CT Abd showed mildly dilated common bile duct with question of distal stone, hx of cholecystectomy 2 years ago, started on NPO, IVF and pain regimen. GI Dr. Chase followed. Stool PCR positive for Campylobacter, Plesiomonas, Shingelloides, & Salmonella, started on Levaquin and ID consulted.  MRI abd showed mild CBD dilatation, favored to represent a postcholecystectomy reservoir effect. No evidence of choledocholithiasis. Patient's diet was advance and was able to tolerate well, and diarrhea has resolved.    Discussed with attending and given clinical course it was decided to discharge patient  This is just a brief course for full course please refer to daily progress and consult notes. 52 year old female with a past medical history of DM and HLD that presents to the hospital with complaints of diarrhea. Admitted for acute gastroenteritis. CT Abd showed mildly dilated common bile duct with question of distal stone, hx of cholecystectomy 2 years ago, started on NPO, IVF and pain regimen. GI Dr. Chase followed. Stool PCR positive for Campylobacter, Plesiomonas, Shingelloides, & Salmonella, started on Levaquin and ID consulted.  MRI abd showed mild CBD dilatation, favored to represent a postcholecystectomy reservoir effect. No evidence of choledocholithiasis. Patient's diet was advanced and was able to tolerate well, diarrhea has resolved.    Discussed with attending and given clinical course it was decided to discharge patient  This is just a brief course for full course please refer to daily progress and consult notes.

## 2022-07-06 NOTE — PROGRESS NOTE ADULT - PROBLEM SELECTOR PLAN 1
Stool exam noted  IVF  replace lytes  GI follow up  ID eval  advance diet and if tolerated, DC planning Stool exam noted  IVF  replace lytes  GI follow up  ID eval  tolerating diet, DC planned resolved  DC planning

## 2022-07-06 NOTE — PROGRESS NOTE ADULT - SUBJECTIVE AND OBJECTIVE BOX
pt seen in icu [  ], reg med floor [   ], bed [  ], chair at bedside [   ]  Awake, alert, comfortable in bed in NAD. No diarrhea. MRCP noted.    REVIEW OF SYSTEMS:    CONSTITUTIONAL: No weakness, fevers or chills  EYES/ENT: No visual changes;  No vertigo or throat pain   NECK: No pain or stiffness  RESPIRATORY: No cough, wheezing, hemoptysis; No shortness of breath  CARDIOVASCULAR: No chest pain or palpitations  GASTROINTESTINAL: No abdominal or epigastric pain. No nausea, vomiting, or hematemesis; No diarrhea or constipation. No melena or hematochezia.  GENITOURINARY: No dysuria, frequency or hematuria  NEUROLOGICAL: No numbness or weakness  SKIN: No itching, burning, rashes, or lesions   All other review of systems is negative unless indicated above.    Physical Exam    General: WN/WD NAD  Neurology: A&Ox3, nonfocal, PARDO x 4  Respiratory: CTA B/L  CV: RRR, S1S2, no murmurs, rubs or gallops  Abdominal: Soft, NT, ND +BS, Last BM  Extremities: No edema, + peripheral pulses      Allergies  No Known Allergies      Health Issues  Calculus of bile duct without obstruction, cholangitis, or cholecystitis    Diabetes mellitus    Hypertension    S/P cholecystectomy        Vitals  T(F): 97.9 (07-06-22 @ 05:05), Max: 97.9 (07-05-22 @ 20:40)  HR: 53 (07-06-22 @ 05:05) (53 - 76)  BP: 104/62 (07-06-22 @ 05:05) (104/62 - 129/59)  RR: 17 (07-06-22 @ 05:05) (16 - 18)  SpO2: 98% (07-06-22 @ 05:05) (98% - 99%)  Wt(kg): --  CAPILLARY BLOOD GLUCOSE      POCT Blood Glucose.: 167 mg/dL (06 Jul 2022 07:30)      Labs                          14.5   5.97  )-----------( 203      ( 06 Jul 2022 06:58 )             42.8       07-06    139  |  104  |  13  ----------------------------<  179<H>  4.2   |  28  |  0.47<L>    Ca    9.0      06 Jul 2022 06:58    TPro  6.7  /  Alb  3.1<L>  /  TBili  0.4  /  DBili  x   /  AST  37  /  ALT  84<H>  /  AlkPhos  72  07-06            Radiology Results      Meds    MEDICATIONS  (STANDING):  atorvastatin 20 milliGRAM(s) Oral at bedtime  dextrose 5%. 1000 milliLiter(s) (50 mL/Hr) IV Continuous <Continuous>  enoxaparin Injectable 40 milliGRAM(s) SubCutaneous every 24 hours  glucagon  Injectable 1 milliGRAM(s) IntraMuscular once  insulin lispro (ADMELOG) corrective regimen sliding scale   SubCutaneous three times a day before meals  insulin lispro (ADMELOG) corrective regimen sliding scale   SubCutaneous at bedtime  levoFLOXacin  Tablet 500 milliGRAM(s) Oral every 24 hours  pantoprazole    Tablet 40 milliGRAM(s) Oral before breakfast  sodium chloride 0.9%. 1000 milliLiter(s) (75 mL/Hr) IV Continuous <Continuous>      MEDICATIONS  (PRN):  ondansetron Injectable 4 milliGRAM(s) IV Push once PRN Nausea and/or Vomiting         pt seen in icu [  ], reg med floor [  + ], bed [ + ], chair at bedside [   ]  Awake, alert, comfortable in bed in NAD. No diarrhea. Discharge is being planned today.     REVIEW OF SYSTEMS:    CONSTITUTIONAL: No weakness, fevers or chills  EYES/ENT: No visual changes;  No vertigo or throat pain   NECK: No pain or stiffness  RESPIRATORY: No cough, wheezing, hemoptysis; No shortness of breath  CARDIOVASCULAR: No chest pain or palpitations  GASTROINTESTINAL: No abdominal or epigastric pain. No nausea, vomiting, or hematemesis; No diarrhea or constipation. No melena or hematochezia.  GENITOURINARY: No dysuria, frequency or hematuria  NEUROLOGICAL: No numbness or weakness  SKIN: No itching, burning, rashes, or lesions   All other review of systems is negative unless indicated above.    Physical Exam    General: WN/WD NAD  Neurology: A&Ox3, nonfocal, PARDO x 4  Respiratory: CTA B/L  CV: RRR, S1S2, no murmurs, rubs or gallops  Abdominal: Soft, NT, ND +BS, Last BM  Extremities: No edema, + peripheral pulses      Allergies  No Known Allergies      Health Issues  Calculus of bile duct without obstruction, cholangitis, or cholecystitis    Diabetes mellitus    Hypertension    S/P cholecystectomy        Vitals  T(F): 97.9 (07-06-22 @ 05:05), Max: 97.9 (07-05-22 @ 20:40)  HR: 53 (07-06-22 @ 05:05) (53 - 76)  BP: 104/62 (07-06-22 @ 05:05) (104/62 - 129/59)  RR: 17 (07-06-22 @ 05:05) (16 - 18)  SpO2: 98% (07-06-22 @ 05:05) (98% - 99%)  Wt(kg): --  CAPILLARY BLOOD GLUCOSE      POCT Blood Glucose.: 167 mg/dL (06 Jul 2022 07:30)      Labs  Comprehensive Metabolic Panel in AM (07.06.22 @ 06:58)   Sodium, Serum: 139 mmol/L   Potassium, Serum: 4.2 mmol/L   Chloride, Serum: 104 mmol/L   Carbon Dioxide, Serum: 28 mmol/L   Anion Gap, Serum: 7 mmol/L   Blood Urea Nitrogen, Serum: 13 mg/dL   Creatinine, Serum: 0.47 mg/dL   Glucose, Serum: 179 mg/dL   Calcium, Total Serum: 9.0 mg/dL   Protein Total, Serum: 6.7 g/dL   Albumin, Serum: 3.1 g/dL   Bilirubin Total, Serum: 0.4 mg/dL   Alkaline Phosphatase, Serum: 72 U/L   Aspartate Aminotransferase (AST/SGOT): 37 U/L   Alanine Aminotransferase (ALT/SGPT): 84 U/L DA   eGFR: 113:Comprehensive Metabolic Panel in AM (07.06.22 @ 06:58)   Sodium, Serum: 139 mmol/L   Potassium, Serum: 4.2 mmol/L   Chloride, Serum: 104 mmol/L   Carbon Dioxide, Serum: 28 mmol/L   Anion Gap, Serum: 7 mmol/L   Blood Urea Nitrogen, Serum: 13 mg/dL   Creatinine, Serum: 0.47 mg/dL   Glucose, Serum: 179 mg/dL   Calcium, Total Serum: 9.0 mg/dL   Protein Total, Serum: 6.7 g/dL   Albumin, Serum: 3.1 g/dL   Bilirubin Total, Serum: 0.4 mg/dL   Alkaline Phosphatase, Serum: 72 U/L   Aspartate Aminotransferase (AST/SGOT): 37 U/L   Alanine Aminotransferase (ALT/SGPT): 84 U/L DA   eGFR: 113:Complete Blood Count in AM (07.06.22 @ 06:58)   Nucleated RBC: 0 /100 WBCs   WBC Count: 5.97 K/uL   RBC Count: 4.80 M/uL   Hemoglobin: 14.5 g/dL   Hematocrit: 42.8 %   Mean Cell Volume: 89.2 fl   Mean Cell Hemoglobin: 30.2 pg   Mean Cell Hemoglobin Conc: 33.9 gm/dL   Red Cell Distrib Width: 12.8 %   Platelet Count - Automated: 203 K/uL                             14.5   5.97  )-----------( 203      ( 06 Jul 2022 06:58 )             42.8       07-06    139  |  104  |  13  ----------------------------<  179<H>  4.2   |  28  |  0.47<L>    Ca    9.0      06 Jul 2022 06:58    TPro  6.7  /  Alb  3.1<L>  /  TBili  0.4  /  DBili  x   /  AST  37  /  ALT  84<H>  /  AlkPhos  72  07-06            Radiology Results      Meds    MEDICATIONS  (STANDING):  atorvastatin 20 milliGRAM(s) Oral at bedtime  dextrose 5%. 1000 milliLiter(s) (50 mL/Hr) IV Continuous <Continuous>  enoxaparin Injectable 40 milliGRAM(s) SubCutaneous every 24 hours  glucagon  Injectable 1 milliGRAM(s) IntraMuscular once  insulin lispro (ADMELOG) corrective regimen sliding scale   SubCutaneous three times a day before meals  insulin lispro (ADMELOG) corrective regimen sliding scale   SubCutaneous at bedtime  levoFLOXacin  Tablet 500 milliGRAM(s) Oral every 24 hours  pantoprazole    Tablet 40 milliGRAM(s) Oral before breakfast  sodium chloride 0.9%. 1000 milliLiter(s) (75 mL/Hr) IV Continuous <Continuous>      MEDICATIONS  (PRN):  ondansetron Injectable 4 milliGRAM(s) IV Push once PRN Nausea and/or Vomiting

## 2022-07-06 NOTE — DISCHARGE NOTE PROVIDER - CARE PROVIDER_API CALL
Arelis Mancini X  INTERNAL MEDICINE  [address] [place] [address]  Phone: [phone]  Fax: [phone]  Follow Up Time: 1 week   Jeanie Mancinii X  INTERNAL MEDICINE  [address] [place] [address]  Phone: [phone]  Fax: [phone]  Follow Up Time: 1 week    Slava Chase)  [facility]  [address] [place] [address]  Phone: [phone]  Fax: [phone]  Follow Up Time: 1 week

## 2022-07-06 NOTE — PROGRESS NOTE ADULT - SKIN
warm and dry/pale/no rashes/no ulcers
warm and dry/pale/no rashes/no ulcers
warm and dry/pale
warm and dry/pale

## 2022-07-06 NOTE — PROGRESS NOTE ADULT - REASON FOR ADMISSION
diarrhea, decreased oral intake

## 2022-07-06 NOTE — DISCHARGE NOTE PROVIDER - NSDCMRMEDTOKEN_GEN_ALL_CORE_FT
metFORMIN 500 mg oral tablet: 1 tab(s) orally 2 times a day  pravastatin 20 mg oral tablet: 1 tab(s) orally once a day   levoFLOXacin 500 mg oral tablet: 1 tab(s) orally every 24 hours  metFORMIN 500 mg oral tablet: 1 tab(s) orally 2 times a day  pravastatin 20 mg oral tablet: 1 tab(s) orally once a day

## 2022-07-06 NOTE — PROGRESS NOTE ADULT - PROBLEM SELECTOR PLAN 2
MRCP noted  GI follow up  Surgical eval. MRCP noted  GI follow up noted  Surgical eval. MRCP noted  GI follow up noted

## 2022-07-06 NOTE — DISCHARGE NOTE NURSING/CASE MANAGEMENT/SOCIAL WORK - NSDCPEFALRISK_GEN_ALL_CORE
For information on Fall & Injury Prevention, visit: [email or web address] OR  [email or web address] OR  https://www.cdc.gov/steadi/patient.html

## 2022-07-06 NOTE — PROGRESS NOTE ADULT - PROBLEM SELECTOR PROBLEM 2
Choledocholithiasis
Dilated cbd, acquired

## 2022-07-06 NOTE — PROGRESS NOTE ADULT - NEGATIVE PSYCHIATRIC SYMPTOMS
no suicidal ideation/no depression/no anxiety/no insomnia/no memory loss/no paranoia/no mood swings

## 2022-07-06 NOTE — PROGRESS NOTE ADULT - NEGATIVE GASTROINTESTINAL SYMPTOMS
no nausea/no vomiting/no constipation/no melena/no hematochezia/no steatorrhea/no jaundice/no hiccoughs

## 2022-07-06 NOTE — PROGRESS NOTE ADULT - PROVIDER SPECIALTY LIST ADULT
Internal Medicine
Gastroenterology
Internal Medicine
Gastroenterology

## 2022-07-06 NOTE — PROGRESS NOTE ADULT - NEGATIVE GENERAL SYMPTOMS
no fever/no chills/no polyphagia/no polyuria/no polydipsia

## 2022-07-18 PROBLEM — I10 ESSENTIAL (PRIMARY) HYPERTENSION: Chronic | Status: ACTIVE | Noted: 2022-06-30

## 2022-07-18 PROBLEM — E11.9 TYPE 2 DIABETES MELLITUS WITHOUT COMPLICATIONS: Chronic | Status: ACTIVE | Noted: 2022-06-30

## 2022-07-21 ENCOUNTER — NON-APPOINTMENT (OUTPATIENT)
Age: 52
End: 2022-07-21

## 2022-07-25 ENCOUNTER — OUTPATIENT (OUTPATIENT)
Dept: OUTPATIENT SERVICES | Facility: HOSPITAL | Age: 52
LOS: 1 days | End: 2022-07-25
Payer: MEDICAID

## 2022-07-25 ENCOUNTER — TRANSCRIPTION ENCOUNTER (OUTPATIENT)
Age: 52
End: 2022-07-25

## 2022-07-25 ENCOUNTER — RESULT REVIEW (OUTPATIENT)
Age: 52
End: 2022-07-25

## 2022-07-25 VITALS
RESPIRATION RATE: 18 BRPM | OXYGEN SATURATION: 100 % | DIASTOLIC BLOOD PRESSURE: 60 MMHG | SYSTOLIC BLOOD PRESSURE: 111 MMHG | HEART RATE: 63 BPM

## 2022-07-25 VITALS
HEART RATE: 86 BPM | HEIGHT: 61 IN | OXYGEN SATURATION: 99 % | DIASTOLIC BLOOD PRESSURE: 68 MMHG | WEIGHT: 156.97 LBS | RESPIRATION RATE: 20 BRPM | TEMPERATURE: 98 F | SYSTOLIC BLOOD PRESSURE: 123 MMHG

## 2022-07-25 DIAGNOSIS — Z90.49 ACQUIRED ABSENCE OF OTHER SPECIFIED PARTS OF DIGESTIVE TRACT: Chronic | ICD-10-CM

## 2022-07-25 DIAGNOSIS — K62.5 HEMORRHAGE OF ANUS AND RECTUM: ICD-10-CM

## 2022-07-25 LAB — GLUCOSE BLDC GLUCOMTR-MCNC: 156 MG/DL — HIGH (ref 70–99)

## 2022-07-25 PROCEDURE — 82962 GLUCOSE BLOOD TEST: CPT

## 2022-07-25 PROCEDURE — 45380 COLONOSCOPY AND BIOPSY: CPT

## 2022-07-25 PROCEDURE — 88305 TISSUE EXAM BY PATHOLOGIST: CPT | Mod: 26

## 2022-07-25 PROCEDURE — 88305 TISSUE EXAM BY PATHOLOGIST: CPT

## 2022-07-25 NOTE — ASU DISCHARGE PLAN (ADULT/PEDIATRIC) - NS MD DC FALL RISK RISK
For information on Fall & Injury Prevention, visit: [email or web address] OR  [email or web address] OR  https://www.cdc.gov/steadi/patient.html
Statement Selected

## 2022-07-27 LAB — SURGICAL PATHOLOGY STUDY: SIGNIFICANT CHANGE UP
